# Patient Record
Sex: MALE | Race: WHITE | NOT HISPANIC OR LATINO | Employment: OTHER | URBAN - METROPOLITAN AREA
[De-identification: names, ages, dates, MRNs, and addresses within clinical notes are randomized per-mention and may not be internally consistent; named-entity substitution may affect disease eponyms.]

---

## 2017-06-07 ENCOUNTER — HOSPITAL ENCOUNTER (OUTPATIENT)
Facility: HOSPITAL | Age: 47
Setting detail: OBSERVATION
LOS: 1 days | Discharge: HOME | End: 2017-06-09
Attending: EMERGENCY MEDICINE | Admitting: HOSPITALIST

## 2017-06-07 ENCOUNTER — APPOINTMENT (OUTPATIENT)
Dept: CT IMAGING | Facility: HOSPITAL | Age: 47
End: 2017-06-07

## 2017-06-07 ENCOUNTER — APPOINTMENT (OUTPATIENT)
Dept: GENERAL RADIOLOGY | Facility: HOSPITAL | Age: 47
End: 2017-06-07

## 2017-06-07 DIAGNOSIS — R10.31 RIGHT LOWER QUADRANT ABDOMINAL PAIN: ICD-10-CM

## 2017-06-07 DIAGNOSIS — R41.82 ALTERED MENTAL STATUS, UNSPECIFIED ALTERED MENTAL STATUS TYPE: ICD-10-CM

## 2017-06-07 DIAGNOSIS — A41.9 SEPSIS, DUE TO UNSPECIFIED ORGANISM: Primary | ICD-10-CM

## 2017-06-07 PROBLEM — R10.9 ABDOMINAL PAIN: Status: ACTIVE | Noted: 2017-06-07

## 2017-06-07 PROBLEM — D72.829 LEUKOCYTOSIS: Status: ACTIVE | Noted: 2017-06-07

## 2017-06-07 PROBLEM — R65.10 SIRS (SYSTEMIC INFLAMMATORY RESPONSE SYNDROME) (HCC): Status: ACTIVE | Noted: 2017-06-07

## 2017-06-07 PROBLEM — R79.89 LACTATE BLOOD INCREASED: Status: ACTIVE | Noted: 2017-06-07

## 2017-06-07 PROBLEM — E66.9 OBESE: Status: ACTIVE | Noted: 2017-06-07

## 2017-06-07 PROBLEM — E11.9 TYPE 2 DIABETES MELLITUS WITHOUT COMPLICATION: Status: ACTIVE | Noted: 2017-06-07

## 2017-06-07 PROBLEM — R74.8 ALKALINE PHOSPHATASE ELEVATION: Status: ACTIVE | Noted: 2017-06-07

## 2017-06-07 LAB
ALBUMIN SERPL-MCNC: 4.4 G/DL (ref 3.2–4.8)
ALBUMIN/GLOB SERPL: 1 G/DL (ref 1.5–2.5)
ALP SERPL-CCNC: 106 U/L (ref 25–100)
ALT SERPL W P-5'-P-CCNC: 36 U/L (ref 7–40)
ANION GAP SERPL CALCULATED.3IONS-SCNC: 6 MMOL/L (ref 3–11)
AST SERPL-CCNC: 25 U/L (ref 0–33)
BASOPHILS # BLD AUTO: 0.01 10*3/MM3 (ref 0–0.2)
BASOPHILS NFR BLD AUTO: 0.1 % (ref 0–1)
BILIRUB SERPL-MCNC: 1.4 MG/DL (ref 0.3–1.2)
BILIRUB UR QL STRIP: NEGATIVE
BUN BLD-MCNC: 13 MG/DL (ref 9–23)
BUN/CREAT SERPL: 13 (ref 7–25)
CALCIUM SPEC-SCNC: 9.6 MG/DL (ref 8.7–10.4)
CHLORIDE SERPL-SCNC: 102 MMOL/L (ref 99–109)
CLARITY UR: CLEAR
CO2 SERPL-SCNC: 28 MMOL/L (ref 20–31)
COLOR UR: YELLOW
CREAT BLD-MCNC: 1 MG/DL (ref 0.6–1.3)
D-LACTATE SERPL-SCNC: 2.3 MMOL/L (ref 0.5–2)
D-LACTATE SERPL-SCNC: 2.4 MMOL/L (ref 0.5–2)
D-LACTATE SERPL-SCNC: 2.5 MMOL/L (ref 0.5–2)
DEPRECATED RDW RBC AUTO: 40.2 FL (ref 37–54)
EOSINOPHIL # BLD AUTO: 0.06 10*3/MM3 (ref 0.1–0.3)
EOSINOPHIL NFR BLD AUTO: 0.4 % (ref 0–3)
ERYTHROCYTE [DISTWIDTH] IN BLOOD BY AUTOMATED COUNT: 12.9 % (ref 11.3–14.5)
FLUAV AG NPH QL: NEGATIVE
FLUBV AG NPH QL IA: NEGATIVE
GFR SERPL CREATININE-BSD FRML MDRD: 80 ML/MIN/1.73
GLOBULIN UR ELPH-MCNC: 4.3 GM/DL
GLUCOSE BLD-MCNC: 236 MG/DL (ref 70–100)
GLUCOSE BLDC GLUCOMTR-MCNC: 223 MG/DL (ref 70–130)
GLUCOSE BLDC GLUCOMTR-MCNC: 232 MG/DL (ref 70–130)
GLUCOSE BLDC GLUCOMTR-MCNC: 248 MG/DL (ref 70–130)
GLUCOSE BLDC GLUCOMTR-MCNC: 280 MG/DL (ref 70–130)
GLUCOSE UR STRIP-MCNC: ABNORMAL MG/DL
HCT VFR BLD AUTO: 49.3 % (ref 38.9–50.9)
HGB BLD-MCNC: 17.3 G/DL (ref 13.1–17.5)
HGB UR QL STRIP.AUTO: NEGATIVE
HOLD SPECIMEN: NORMAL
HOLD SPECIMEN: NORMAL
IMM GRANULOCYTES # BLD: 0.03 10*3/MM3 (ref 0–0.03)
IMM GRANULOCYTES NFR BLD: 0.2 % (ref 0–0.6)
KETONES UR QL STRIP: ABNORMAL
LEUKOCYTE ESTERASE UR QL STRIP.AUTO: NEGATIVE
LIPASE SERPL-CCNC: 29 U/L (ref 6–51)
LYMPHOCYTES # BLD AUTO: 0.46 10*3/MM3 (ref 0.6–4.8)
LYMPHOCYTES NFR BLD AUTO: 2.9 % (ref 24–44)
MCH RBC QN AUTO: 29.9 PG (ref 27–31)
MCHC RBC AUTO-ENTMCNC: 35.1 G/DL (ref 32–36)
MCV RBC AUTO: 85.3 FL (ref 80–99)
MONOCYTES # BLD AUTO: 0.44 10*3/MM3 (ref 0–1)
MONOCYTES NFR BLD AUTO: 2.7 % (ref 0–12)
NEUTROPHILS # BLD AUTO: 15.04 10*3/MM3 (ref 1.5–8.3)
NEUTROPHILS NFR BLD AUTO: 93.7 % (ref 41–71)
NITRITE UR QL STRIP: NEGATIVE
PH UR STRIP.AUTO: <=5 [PH] (ref 5–8)
PLATELET # BLD AUTO: 194 10*3/MM3 (ref 150–450)
PMV BLD AUTO: 8.9 FL (ref 6–12)
POTASSIUM BLD-SCNC: 4 MMOL/L (ref 3.5–5.5)
PROCALCITONIN SERPL-MCNC: 0.33 NG/ML
PROT SERPL-MCNC: 8.7 G/DL (ref 5.7–8.2)
PROT UR QL STRIP: NEGATIVE
RBC # BLD AUTO: 5.78 10*6/MM3 (ref 4.2–5.76)
S PYO AG THROAT QL: NEGATIVE
SODIUM BLD-SCNC: 136 MMOL/L (ref 132–146)
SP GR UR STRIP: >=1.03 (ref 1–1.03)
UROBILINOGEN UR QL STRIP: ABNORMAL
WBC NRBC COR # BLD: 16.04 10*3/MM3 (ref 3.5–10.8)
WHOLE BLOOD HOLD SPECIMEN: NORMAL
WHOLE BLOOD HOLD SPECIMEN: NORMAL

## 2017-06-07 PROCEDURE — 84145 PROCALCITONIN (PCT): CPT | Performed by: INTERNAL MEDICINE

## 2017-06-07 PROCEDURE — 82962 GLUCOSE BLOOD TEST: CPT

## 2017-06-07 PROCEDURE — 96361 HYDRATE IV INFUSION ADD-ON: CPT

## 2017-06-07 PROCEDURE — G0378 HOSPITAL OBSERVATION PER HR: HCPCS

## 2017-06-07 PROCEDURE — 25010000002 PIPERACILLIN-TAZOBACTAM: Performed by: INTERNAL MEDICINE

## 2017-06-07 PROCEDURE — 87147 CULTURE TYPE IMMUNOLOGIC: CPT | Performed by: EMERGENCY MEDICINE

## 2017-06-07 PROCEDURE — 63710000001 INSULIN LISPRO (HUMAN) PER 5 UNITS: Performed by: INTERNAL MEDICINE

## 2017-06-07 PROCEDURE — 80053 COMPREHEN METABOLIC PANEL: CPT | Performed by: EMERGENCY MEDICINE

## 2017-06-07 PROCEDURE — 25010000002 ONDANSETRON PER 1 MG: Performed by: INTERNAL MEDICINE

## 2017-06-07 PROCEDURE — 25010000002 VANCOMYCIN HCL IN NACL 2-0.9 GM/500ML-% SOLUTION: Performed by: EMERGENCY MEDICINE

## 2017-06-07 PROCEDURE — 83605 ASSAY OF LACTIC ACID: CPT | Performed by: INTERNAL MEDICINE

## 2017-06-07 PROCEDURE — 96375 TX/PRO/DX INJ NEW DRUG ADDON: CPT

## 2017-06-07 PROCEDURE — 25010000002 PIPERACILLIN-TAZOBACTAM: Performed by: EMERGENCY MEDICINE

## 2017-06-07 PROCEDURE — 83605 ASSAY OF LACTIC ACID: CPT | Performed by: EMERGENCY MEDICINE

## 2017-06-07 PROCEDURE — 99220 PR INITIAL OBSERVATION CARE/DAY 70 MINUTES: CPT | Performed by: INTERNAL MEDICINE

## 2017-06-07 PROCEDURE — 71010 HC CHEST PA OR AP: CPT

## 2017-06-07 PROCEDURE — 81003 URINALYSIS AUTO W/O SCOPE: CPT | Performed by: EMERGENCY MEDICINE

## 2017-06-07 PROCEDURE — 87040 BLOOD CULTURE FOR BACTERIA: CPT | Performed by: EMERGENCY MEDICINE

## 2017-06-07 PROCEDURE — 87880 STREP A ASSAY W/OPTIC: CPT | Performed by: EMERGENCY MEDICINE

## 2017-06-07 PROCEDURE — 74176 CT ABD & PELVIS W/O CONTRAST: CPT

## 2017-06-07 PROCEDURE — 0 DIATRIZOATE MEGLUMINE & SODIUM PER 1 ML: Performed by: EMERGENCY MEDICINE

## 2017-06-07 PROCEDURE — 83690 ASSAY OF LIPASE: CPT | Performed by: EMERGENCY MEDICINE

## 2017-06-07 PROCEDURE — 85025 COMPLETE CBC W/AUTO DIFF WBC: CPT | Performed by: EMERGENCY MEDICINE

## 2017-06-07 PROCEDURE — 96365 THER/PROPH/DIAG IV INF INIT: CPT

## 2017-06-07 PROCEDURE — 87081 CULTURE SCREEN ONLY: CPT | Performed by: EMERGENCY MEDICINE

## 2017-06-07 PROCEDURE — 87804 INFLUENZA ASSAY W/OPTIC: CPT | Performed by: EMERGENCY MEDICINE

## 2017-06-07 PROCEDURE — 25010000002 MORPHINE SULFATE (PF) 2 MG/ML SOLUTION: Performed by: INTERNAL MEDICINE

## 2017-06-07 PROCEDURE — 99284 EMERGENCY DEPT VISIT MOD MDM: CPT

## 2017-06-07 RX ORDER — NICOTINE POLACRILEX 4 MG
15 LOZENGE BUCCAL
Status: DISCONTINUED | OUTPATIENT
Start: 2017-06-07 | End: 2017-06-09 | Stop reason: HOSPADM

## 2017-06-07 RX ORDER — SODIUM CHLORIDE 0.9 % (FLUSH) 0.9 %
1-10 SYRINGE (ML) INJECTION AS NEEDED
Status: DISCONTINUED | OUTPATIENT
Start: 2017-06-07 | End: 2017-06-09 | Stop reason: HOSPADM

## 2017-06-07 RX ORDER — POTASSIUM CHLORIDE 750 MG/1
40 CAPSULE, EXTENDED RELEASE ORAL AS NEEDED
Status: DISCONTINUED | OUTPATIENT
Start: 2017-06-07 | End: 2017-06-09 | Stop reason: HOSPADM

## 2017-06-07 RX ORDER — ACETAMINOPHEN 500 MG
1000 TABLET ORAL ONCE
Status: COMPLETED | OUTPATIENT
Start: 2017-06-07 | End: 2017-06-07

## 2017-06-07 RX ORDER — ACETAMINOPHEN 325 MG/1
650 TABLET ORAL EVERY 4 HOURS PRN
Status: DISCONTINUED | OUTPATIENT
Start: 2017-06-07 | End: 2017-06-09 | Stop reason: HOSPADM

## 2017-06-07 RX ORDER — VANCOMYCIN 2 GRAM/500 ML IN 0.9 % SODIUM CHLORIDE INTRAVENOUS
20 ONCE
Status: COMPLETED | OUTPATIENT
Start: 2017-06-07 | End: 2017-06-07

## 2017-06-07 RX ORDER — SODIUM CHLORIDE 9 MG/ML
75 INJECTION, SOLUTION INTRAVENOUS CONTINUOUS
Status: DISCONTINUED | OUTPATIENT
Start: 2017-06-07 | End: 2017-06-09 | Stop reason: HOSPADM

## 2017-06-07 RX ORDER — SENNA AND DOCUSATE SODIUM 50; 8.6 MG/1; MG/1
2 TABLET, FILM COATED ORAL 2 TIMES DAILY PRN
Status: DISCONTINUED | OUTPATIENT
Start: 2017-06-07 | End: 2017-06-09 | Stop reason: HOSPADM

## 2017-06-07 RX ORDER — POTASSIUM CHLORIDE 1.5 G/1.77G
40 POWDER, FOR SOLUTION ORAL AS NEEDED
Status: DISCONTINUED | OUTPATIENT
Start: 2017-06-07 | End: 2017-06-09 | Stop reason: HOSPADM

## 2017-06-07 RX ORDER — MORPHINE SULFATE 2 MG/ML
2 INJECTION, SOLUTION INTRAMUSCULAR; INTRAVENOUS
Status: DISCONTINUED | OUTPATIENT
Start: 2017-06-07 | End: 2017-06-09 | Stop reason: HOSPADM

## 2017-06-07 RX ORDER — SODIUM CHLORIDE 0.9 % (FLUSH) 0.9 %
10 SYRINGE (ML) INJECTION AS NEEDED
Status: DISCONTINUED | OUTPATIENT
Start: 2017-06-07 | End: 2017-06-09 | Stop reason: HOSPADM

## 2017-06-07 RX ORDER — DEXTROSE MONOHYDRATE 25 G/50ML
25 INJECTION, SOLUTION INTRAVENOUS
Status: DISCONTINUED | OUTPATIENT
Start: 2017-06-07 | End: 2017-06-09 | Stop reason: HOSPADM

## 2017-06-07 RX ORDER — ONDANSETRON 2 MG/ML
4 INJECTION INTRAMUSCULAR; INTRAVENOUS EVERY 6 HOURS PRN
Status: DISCONTINUED | OUTPATIENT
Start: 2017-06-07 | End: 2017-06-09 | Stop reason: HOSPADM

## 2017-06-07 RX ORDER — ONDANSETRON 4 MG/1
4 TABLET, FILM COATED ORAL EVERY 6 HOURS PRN
Status: DISCONTINUED | OUTPATIENT
Start: 2017-06-07 | End: 2017-06-09 | Stop reason: HOSPADM

## 2017-06-07 RX ADMIN — SODIUM CHLORIDE 125 ML/HR: 9 INJECTION, SOLUTION INTRAVENOUS at 17:16

## 2017-06-07 RX ADMIN — INSULIN LISPRO 6 UNITS: 100 INJECTION, SOLUTION INTRAVENOUS; SUBCUTANEOUS at 20:24

## 2017-06-07 RX ADMIN — Medication 2000 MG: at 14:17

## 2017-06-07 RX ADMIN — Medication 15 ML: at 12:40

## 2017-06-07 RX ADMIN — ACETAMINOPHEN 650 MG: 325 TABLET, FILM COATED ORAL at 20:24

## 2017-06-07 RX ADMIN — INSULIN LISPRO 4 UNITS: 100 INJECTION, SOLUTION INTRAVENOUS; SUBCUTANEOUS at 17:16

## 2017-06-07 RX ADMIN — SODIUM CHLORIDE 1000 ML: 9 INJECTION, SOLUTION INTRAVENOUS at 14:20

## 2017-06-07 RX ADMIN — ACETAMINOPHEN 650 MG: 325 TABLET, FILM COATED ORAL at 17:20

## 2017-06-07 RX ADMIN — TAZOBACTAM SODIUM AND PIPERACILLIN SODIUM 4.5 G: .5; 4 INJECTION, POWDER, LYOPHILIZED, FOR SOLUTION INTRAVENOUS at 12:54

## 2017-06-07 RX ADMIN — PIPERACILLIN SODIUM,TAZOBACTAM SODIUM 3.38 G: 3; .375 INJECTION, POWDER, FOR SOLUTION INTRAVENOUS at 23:20

## 2017-06-07 RX ADMIN — ONDANSETRON 4 MG: 2 INJECTION INTRAMUSCULAR; INTRAVENOUS at 17:20

## 2017-06-07 RX ADMIN — PIPERACILLIN SODIUM,TAZOBACTAM SODIUM 3.38 G: 3; .375 INJECTION, POWDER, FOR SOLUTION INTRAVENOUS at 17:16

## 2017-06-07 RX ADMIN — SODIUM CHLORIDE 2967 ML: 9 INJECTION, SOLUTION INTRAVENOUS at 15:12

## 2017-06-07 RX ADMIN — SODIUM CHLORIDE 1000 ML: 9 INJECTION, SOLUTION INTRAVENOUS at 12:47

## 2017-06-07 RX ADMIN — ACETAMINOPHEN 1000 MG: 500 TABLET ORAL at 12:49

## 2017-06-07 RX ADMIN — MORPHINE SULFATE 2 MG: 2 INJECTION, SOLUTION INTRAMUSCULAR; INTRAVENOUS at 18:57

## 2017-06-07 NOTE — H&P
"      HOSPITAL MEDICINE HISTORY AND PHYSICAL    PCP: Juaquin Smith MD    Chief Complaint: abd pain, confusion    Subjective     History of Present Illness  47 year old male presenting from home with fever, abd pain, and mild confusion. Started feeling poorly yesterday with generalized fatigue. Ate pizza last night and noticed 1-2 hours later feeling \"funny and tired.\" Then upon waking this am noticed severe lower abdominal pain, worse in RLQ, and also a fever. Pain has now improved after treatment in ED (IVF, vanc, zosyn.) No N/V, diarrhea. Had an event similar to this last year at which time he was diagnosed with gastroenteritis.       Review of Systems   Otherwise complete ROS is negative except as mentioned in the HPI.       Past Medical History:   Past Medical History:   Diagnosis Date   • Diabetes mellitus        Past Surgical History:History reviewed. No pertinent surgical history.    Family History: personally reviewed and found to be noncontributory.    Social History:  reports that he has never smoked. He does not have any smokeless tobacco history on file. He reports that he does not drink alcohol or use illicit drugs.    Medications:    (Not in a hospital admission)  No Known Allergies    Objective    Physical Exam:   Vital Signs: /77  Pulse 109  Temp (!) 102.9 °F (39.4 °C) (Oral)   Resp 20  Ht 70\" (177.8 cm)  Wt 218 lb (98.9 kg)  SpO2 97%  BMI 31.28 kg/m2  Physical Exam  Gen-no acute distress, appears comfortable, obese  HEENT-atraumatic, normocephalic, PERRLA, EOMI, moist mucous membranes present  CV-tachy, regular, no murmur  Resp-CTAB, no wheezes or rales; normal respiratory effort  Abd-soft, NT, ND, +BS; no rebound or guarding  Ext-no edema or clubbing  Skin-warm, dry, no rashes or lesions noted  Neuro-A&Ox3, CN II-XII grossly intact; equal strength in upper and lower extremities; no focal deficits  Psych-appropriate mood and behavior    Results Reviewed:  I have personally reviewed " current lab, radiology, and data and agree.    Results from last 7 days  Lab Units 06/07/17  1145   WBC 10*3/mm3 16.04*   HEMOGLOBIN g/dL 17.3   PLATELETS 10*3/mm3 194       Results from last 7 days  Lab Units 06/07/17  1145   SODIUM mmol/L 136   POTASSIUM mmol/L 4.0   TOTAL CO2 mmol/L 28.0   CREATININE mg/dL 1.00   GLUCOSE mg/dL 236*   CALCIUM mg/dL 9.6     CXR pending    CT A/P personally reviewed without obvious intra-abdominal disease. Agree with interpretation.      Assessment/Plan   Assessment & Plan  Principal Problem:    SIRS (systemic inflammatory response syndrome)  Active Problems:    Leukocytosis    Abdominal pain    Lactate blood increased    Alkaline phosphatase elevation    Obese    Type 2 diabetes mellitus without complication    Plan:  --Patient may have viral enteritis vs food bourne illness. CT A/P with reactive lymphadenopathy. Will await CXR, but no s/s of pulmonary pathology. Will continue zosyn for now.  --IVF and reflex lactate per protocol.   --Patient's alk phos is mildly elevated but no RUQ pain or CT findings concerning for gallbladder pathology. If patient develops return of abdominal pain will consider HIDA.   --Repeat labs in am.    OBSERVATION status, however if further evaluation or treatment plans warrant, status may change. Based upon current information, I predict patient's care encounter to be less than or equal to 2 midnights.    Zainab Lowery II, DO   06/07/17   2:07 PM

## 2017-06-07 NOTE — PLAN OF CARE
Problem: Patient Care Overview (Adult)  Goal: Plan of Care Review  Outcome: Ongoing (interventions implemented as appropriate)    06/07/17 0188   Coping/Psychosocial Response Interventions   Plan Of Care Reviewed With patient;spouse   Patient Care Overview   Progress progress toward functional goals as expected

## 2017-06-07 NOTE — PROGRESS NOTES
Discharge Planning Assessment  [facility]     Patient Name: Gagan Oconnell  MRN: [id number]  Today's Date: 6/7/2017    Admit Date: 6/7/2017          Discharge Needs Assessment       06/07/17 1426    Living Environment    Lives With spouse;child(rodolfo), dependent    Living Arrangements house    Transportation Available family or friend will provide    Living Environment    Provides Primary Care For spouse;child(rodolfo)    Quality Of Family Relationships supportive    Able to Return to Prior Living Arrangements yes    Discharge Needs Assessment    Concerns To Be Addressed other (see comments)    Concerns Comments Pt has no healthcare insurance    Readmission Within The Last 30 Days no previous admission in last 30 days    Equipment Currently Used at Home none    Equipment Needed After Discharge none    Discharge Disposition still a patient    Discharge Contact Information if Applicable [phone]            Discharge Plan       06/07/17 1423    Case Management/Social Work Plan    Plan Home    Patient/Family In Agreement With Plan yes    Additional Comments Spoke with pt. at . Pt lives with spouse and dependent children in [facility]. Pt. is independent with ADL's and mobility. Pt has no DME or HH. His PCP is Tram Chance Pt states he does not take medications regularly and he does not have insurance so he will pay out of pocket. Plan for discharge is to return home. Pt  to transport home via car, when medically ready. Case Management will follow and assist with any discharge planning needs.        Discharge Placement     No information found                Demographic Summary       06/07/17 1422    Referral Information    Arrived From home or self-care    Reason For Consult discharge planning    Contact Information    Permission Granted to Share Information With family/designee    Comments AnishJosi (Spouse) Home Phone: [phone]    Primary Care Physician Information    Name TRAM CHANCE             Functional Status       06/07/17 1426    Functional Status Prior    Ambulation 0-->independent    Transferring 0-->independent    Toileting 0-->independent    Bathing 0-->independent    Dressing 0-->independent    Eating 0-->independent    Communication 0-->understands/communicates without difficulty    Swallowing 0-->swallows foods/liquids without difficulty    IADL    Medications independent    Meal Preparation independent    Housekeeping independent    Laundry independent    Shopping independent    Oral Care independent    Activity Tolerance    Usual Activity Tolerance good    Current Activity Tolerance poor    Employment/Financial    Employment/Finance Comments Pt states he has no insurance            Psychosocial     None            Abuse/Neglect     None            Legal     None            Substance Abuse     None            Patient Forms     None          Arabella Rodgers RN

## 2017-06-07 NOTE — ED PROVIDER NOTES
"Subjective   HPI Comments: Gagan Oconnell is a 47 y.o.male who presents to the ED with c/o abd pain. Yesterday throughout the day he felt fatigued and ate pizza at 1500 and for dinner ate a can of beef stew. He woke up this morning with severe low abdominal pain when trying to have a BM, nausea and a mild fever. He tried to go to work but had to lay down for relief. He has continue to feel fatigued and as if his abd is \"in a knot\" and he has generalized myalgias. In the ED he states that sitting on the floor makes him more comfortable, he has a temp of 100.8 and his wife states that he is agitated and not acting like himself.     Hx of type 2 DM, he does not take medication for this and his sugar runs in the 240-250s      Patient is a 47 y.o. male presenting with abdominal pain.   History provided by:  Patient  Abdominal Pain   Pain location:  Suprapubic  Pain quality: cramping    Pain radiates to:  Does not radiate  Pain severity:  Severe  Onset quality:  Sudden  Duration:  1 day  Timing:  Constant  Progression:  Waxing and waning  Chronicity:  New  Worsened by:  Bowel movements  Associated symptoms: anorexia, fatigue, fever and nausea    Associated symptoms: no chest pain, no diarrhea, no dysuria, no shortness of breath, no sore throat and no vomiting        Review of Systems   Constitutional: Positive for fatigue and fever.   HENT: Negative for sore throat.    Respiratory: Negative for shortness of breath.    Cardiovascular: Negative for chest pain.   Gastrointestinal: Positive for abdominal pain, anorexia and nausea. Negative for diarrhea and vomiting.   Genitourinary: Negative for dysuria.   Psychiatric/Behavioral: Positive for agitation and confusion.   All other systems reviewed and are negative.      Past Medical History:   Diagnosis Date   • Diabetes mellitus        No Known Allergies    History reviewed. No pertinent surgical history.    History reviewed. No pertinent family history.    Social History "     Social History   • Marital status:      Spouse name: N/A   • Number of children: N/A   • Years of education: N/A     Social History Main Topics   • Smoking status: Never Smoker   • Smokeless tobacco: None   • Alcohol use No   • Drug use: No   • Sexual activity: Defer     Other Topics Concern   • None     Social History Narrative   • None         Objective   Physical Exam   Constitutional: He is oriented to person, place, and time. He appears well-developed and well-nourished. No distress.   appears uncomfortable, somewhat rambling speech   HENT:   Head: Normocephalic and atraumatic.   Mild bitonsillar enlargement, no exudate, may be chronic. No erythema.    Eyes: Conjunctivae are normal. No scleral icterus.   Neck: Normal range of motion. Neck supple.   Cardiovascular: Regular rhythm and normal heart sounds.  Tachycardia present.    Pulmonary/Chest: Effort normal and breath sounds normal. No respiratory distress.   Abdominal: Soft. There is tenderness (mild RLQ TTP).   Musculoskeletal: Normal range of motion. He exhibits no edema.   Lymphadenopathy:     He has no cervical adenopathy.   Neurological: He is alert and oriented to person, place, and time.   Skin: Skin is warm and dry. No rash noted. He is not diaphoretic.   Psychiatric: He has a normal mood and affect. His behavior is normal.   Nursing note and vitals reviewed.      Critical Care  Performed by: BRANDON GODINEZ  Authorized by: BRANDON GODINEZ   Total critical care time: 30 minutes  Critical care time was exclusive of separately billable procedures and treating other patients and teaching time.  Critical care was necessary to treat or prevent imminent or life-threatening deterioration of the following conditions: sepsis.  Critical care was time spent personally by me on the following activities: discussions with consultants, development of treatment plan with patient or surrogate, evaluation of patient's response to treatment, examination of  patient, ordering and performing treatments and interventions, obtaining history from patient or surrogate, ordering and review of laboratory studies, ordering and review of radiographic studies, re-evaluation of patient's condition, pulse oximetry and review of old charts.  Comments: Several evaluations, initiation of sepsis abx, fluid rehydration, consultation with hospitalist, decision to admit to the floor.                ED Course  ED Course   Comment By Time   On recheck, pt has an oral temp of 102.9 and on abdominal exam he no longer has RLQ tenderness. Luis Eduardo Hussein 06/07 1244   Dr. Gallegos discussed with the hospitalist who will admit. Luis Eduardo Hussein 06/07 1350   Dr. Gallegos discussed the plan of care with the pt and family including need for admission, all are agreeable.     Pt's family report that he is calmer and less agitated and pt reports he feels much improved and is more comfortable. Luis Eduardo Hussein 06/07 1356       Recent Results (from the past 24 hour(s))   POC Glucose Fingerstick    Collection Time: 06/07/17 10:30 AM   Result Value Ref Range    Glucose 248 (H) 70 - 130 mg/dL   POC Glucose Fingerstick    Collection Time: 06/07/17 11:42 AM   Result Value Ref Range    Glucose 232 (H) 70 - 130 mg/dL   Comprehensive Metabolic Panel    Collection Time: 06/07/17 11:45 AM   Result Value Ref Range    Glucose 236 (H) 70 - 100 mg/dL    BUN 13 9 - 23 mg/dL    Creatinine 1.00 0.60 - 1.30 mg/dL    Sodium 136 132 - 146 mmol/L    Potassium 4.0 3.5 - 5.5 mmol/L    Chloride 102 99 - 109 mmol/L    CO2 28.0 20.0 - 31.0 mmol/L    Calcium 9.6 8.7 - 10.4 mg/dL    Total Protein 8.7 (H) 5.7 - 8.2 g/dL    Albumin 4.40 3.20 - 4.80 g/dL    ALT (SGPT) 36 7 - 40 U/L    AST (SGOT) 25 0 - 33 U/L    Alkaline Phosphatase 106 (H) 25 - 100 U/L    Total Bilirubin 1.4 (H) 0.3 - 1.2 mg/dL    eGFR Non African Amer 80 >60 mL/min/1.73    Globulin 4.3 gm/dL    A/G Ratio 1.0 (L) 1.5 - 2.5 g/dL    BUN/Creatinine Ratio 13.0 7.0 - 25.0    Anion Gap  6.0 3.0 - 11.0 mmol/L   Lipase    Collection Time: 06/07/17 11:45 AM   Result Value Ref Range    Lipase 29 6 - 51 U/L   Light Blue Top    Collection Time: 06/07/17 11:45 AM   Result Value Ref Range    Extra Tube hold for add-on    Green Top (Gel)    Collection Time: 06/07/17 11:45 AM   Result Value Ref Range    Extra Tube Hold for add-ons.    Lavender Top    Collection Time: 06/07/17 11:45 AM   Result Value Ref Range    Extra Tube hold for add-on    Gold Top - SST    Collection Time: 06/07/17 11:45 AM   Result Value Ref Range    Extra Tube Hold for add-ons.    CBC Auto Differential    Collection Time: 06/07/17 11:45 AM   Result Value Ref Range    WBC 16.04 (H) 3.50 - 10.80 10*3/mm3    RBC 5.78 (H) 4.20 - 5.76 10*6/mm3    Hemoglobin 17.3 13.1 - 17.5 g/dL    Hematocrit 49.3 38.9 - 50.9 %    MCV 85.3 80.0 - 99.0 fL    MCH 29.9 27.0 - 31.0 pg    MCHC 35.1 32.0 - 36.0 g/dL    RDW 12.9 11.3 - 14.5 %    RDW-SD 40.2 37.0 - 54.0 fl    MPV 8.9 6.0 - 12.0 fL    Platelets 194 150 - 450 10*3/mm3    Neutrophil % 93.7 (H) 41.0 - 71.0 %    Lymphocyte % 2.9 (L) 24.0 - 44.0 %    Monocyte % 2.7 0.0 - 12.0 %    Eosinophil % 0.4 0.0 - 3.0 %    Basophil % 0.1 0.0 - 1.0 %    Immature Grans % 0.2 0.0 - 0.6 %    Neutrophils, Absolute 15.04 (H) 1.50 - 8.30 10*3/mm3    Lymphocytes, Absolute 0.46 (L) 0.60 - 4.80 10*3/mm3    Monocytes, Absolute 0.44 0.00 - 1.00 10*3/mm3    Eosinophils, Absolute 0.06 (L) 0.10 - 0.30 10*3/mm3    Basophils, Absolute 0.01 0.00 - 0.20 10*3/mm3    Immature Grans, Absolute 0.03 0.00 - 0.03 10*3/mm3   Lactic Acid, Plasma    Collection Time: 06/07/17 11:45 AM   Result Value Ref Range    Lactate 2.4 (C) 0.5 - 2.0 mmol/L   Urinalysis With / Culture If Indicated    Collection Time: 06/07/17 12:36 PM   Result Value Ref Range    Color, UA Yellow Yellow, Straw    Appearance, UA Clear Clear    pH, UA <=5.0 5.0 - 8.0    Specific Gravity, UA >=1.030 1.001 - 1.030    Glucose, UA >=1000 mg/dL (3+) (A) Negative    Ketones, UA 15  mg/dL (1+) (A) Negative    Bilirubin, UA Negative Negative    Blood, UA Negative Negative    Protein, UA Negative Negative    Leuk Esterase, UA Negative Negative    Nitrite, UA Negative Negative    Urobilinogen, UA 0.2 E.U./dL 0.2 - 1.0 E.U./dL   Rapid Strep A Screen    Collection Time: 06/07/17 12:57 PM   Result Value Ref Range    Strep A Ag Negative Negative   Influenza Antigen    Collection Time: 06/07/17 12:57 PM   Result Value Ref Range    Influenza A Ag, EIA Negative Negative    Influenza B Ag, EIA Negative Negative     Note: In addition to lab results from this visit, the labs listed above may include labs taken at another facility or during a different encounter within the last 24 hours. Please correlate lab times with ED admission and discharge times for further clarification of the services performed during this visit.    CT Abdomen Pelvis Without Contrast    (Results Pending)   XR Chest 1 View    (Results Pending)     Vitals:    06/07/17 1146 06/07/17 1234 06/07/17 1235 06/07/17 1244   BP:  126/77     Pulse: 106  109    Resp:       Temp:    (!) 102.9 °F (39.4 °C)   TempSrc:    Oral   SpO2: 97%  97%    Weight:       Height:         Medications   sodium chloride 0.9 % flush 10 mL (not administered)   sodium chloride 0.9 % bolus 2,967 mL (1,000 mL Intravenous New Bag 6/7/17 1247)   vancomycin IVPB 2000 mg in 0.9% Sodium Chloride (premix) 500 mL (not administered)   piperacillin-tazobactam (ZOSYN) 4.5 g/100 mL 0.9% NS IVPB (mbp) (4.5 g Intravenous New Bag 6/7/17 1254)   diatrizoate meglumine-sodium (GASTROGRAFIN) 66-10 % solution 15 mL (15 mL Oral Given 6/7/17 1240)   acetaminophen (TYLENOL) tablet 1,000 mg (1,000 mg Oral Given 6/7/17 1249)     ECG/EMG Results (last 24 hours)     ** No results found for the last 24 hours. **                      MDM    Final diagnoses:   Sepsis, due to unspecified organism   Altered mental status, unspecified altered mental status type   Right lower quadrant abdominal pain -  transient and now resolved.       Documentation assistance provided by sally Hussein.  Information recorded by the sally was done at my direction and has been verified and validated by me.     Luis Eduardo Hussein  06/07/17 1145       Luis Eduardo Hussein  06/07/17 1246       Luis Eduardo Hussein  06/07/17 0076       Gray Gallegos MD  06/09/17 5704

## 2017-06-08 LAB
ANION GAP SERPL CALCULATED.3IONS-SCNC: 7 MMOL/L (ref 3–11)
BUN BLD-MCNC: 9 MG/DL (ref 9–23)
BUN/CREAT SERPL: 10 (ref 7–25)
C DIFF TOX GENS STL QL NAA+PROBE: NOT DETECTED
CALCIUM SPEC-SCNC: 7.7 MG/DL (ref 8.7–10.4)
CHLORIDE SERPL-SCNC: 107 MMOL/L (ref 99–109)
CO2 SERPL-SCNC: 23 MMOL/L (ref 20–31)
CREAT BLD-MCNC: 0.9 MG/DL (ref 0.6–1.3)
DEPRECATED RDW RBC AUTO: 41.5 FL (ref 37–54)
ERYTHROCYTE [DISTWIDTH] IN BLOOD BY AUTOMATED COUNT: 13.1 % (ref 11.3–14.5)
GFR SERPL CREATININE-BSD FRML MDRD: 91 ML/MIN/1.73
GLUCOSE BLD-MCNC: 223 MG/DL (ref 70–100)
GLUCOSE BLDC GLUCOMTR-MCNC: 216 MG/DL (ref 70–130)
GLUCOSE BLDC GLUCOMTR-MCNC: 229 MG/DL (ref 70–130)
GLUCOSE BLDC GLUCOMTR-MCNC: 233 MG/DL (ref 70–130)
GLUCOSE BLDC GLUCOMTR-MCNC: 235 MG/DL (ref 70–130)
HCT VFR BLD AUTO: 40.3 % (ref 38.9–50.9)
HGB BLD-MCNC: 13.6 G/DL (ref 13.1–17.5)
MCH RBC QN AUTO: 29.2 PG (ref 27–31)
MCHC RBC AUTO-ENTMCNC: 33.7 G/DL (ref 32–36)
MCV RBC AUTO: 86.7 FL (ref 80–99)
PLATELET # BLD AUTO: 155 10*3/MM3 (ref 150–450)
PMV BLD AUTO: 8.9 FL (ref 6–12)
POTASSIUM BLD-SCNC: 3.3 MMOL/L (ref 3.5–5.5)
RBC # BLD AUTO: 4.65 10*6/MM3 (ref 4.2–5.76)
SODIUM BLD-SCNC: 137 MMOL/L (ref 132–146)
WBC NRBC COR # BLD: 7.78 10*3/MM3 (ref 3.5–10.8)
WBC STL QL MICRO: NORMAL

## 2017-06-08 PROCEDURE — 87493 C DIFF AMPLIFIED PROBE: CPT | Performed by: HOSPITALIST

## 2017-06-08 PROCEDURE — 25010000002 MORPHINE SULFATE (PF) 2 MG/ML SOLUTION: Performed by: INTERNAL MEDICINE

## 2017-06-08 PROCEDURE — 96376 TX/PRO/DX INJ SAME DRUG ADON: CPT

## 2017-06-08 PROCEDURE — G0108 DIAB MANAGE TRN  PER INDIV: HCPCS

## 2017-06-08 PROCEDURE — 80048 BASIC METABOLIC PNL TOTAL CA: CPT | Performed by: INTERNAL MEDICINE

## 2017-06-08 PROCEDURE — 82962 GLUCOSE BLOOD TEST: CPT

## 2017-06-08 PROCEDURE — 25010000002 ENOXAPARIN PER 10 MG: Performed by: INTERNAL MEDICINE

## 2017-06-08 PROCEDURE — 87046 STOOL CULTR AEROBIC BACT EA: CPT | Performed by: HOSPITALIST

## 2017-06-08 PROCEDURE — 93010 ELECTROCARDIOGRAM REPORT: CPT | Performed by: INTERNAL MEDICINE

## 2017-06-08 PROCEDURE — 93005 ELECTROCARDIOGRAM TRACING: CPT | Performed by: PHYSICIAN ASSISTANT

## 2017-06-08 PROCEDURE — 85027 COMPLETE CBC AUTOMATED: CPT | Performed by: INTERNAL MEDICINE

## 2017-06-08 PROCEDURE — 25010000002 PIPERACILLIN-TAZOBACTAM: Performed by: INTERNAL MEDICINE

## 2017-06-08 PROCEDURE — G0378 HOSPITAL OBSERVATION PER HR: HCPCS

## 2017-06-08 PROCEDURE — 96372 THER/PROPH/DIAG INJ SC/IM: CPT

## 2017-06-08 PROCEDURE — 87205 SMEAR GRAM STAIN: CPT | Performed by: HOSPITALIST

## 2017-06-08 PROCEDURE — 87045 FECES CULTURE AEROBIC BACT: CPT | Performed by: HOSPITALIST

## 2017-06-08 PROCEDURE — 25010000002 ONDANSETRON PER 1 MG: Performed by: INTERNAL MEDICINE

## 2017-06-08 PROCEDURE — 99226 PR SBSQ OBSERVATION CARE/DAY 35 MINUTES: CPT | Performed by: HOSPITALIST

## 2017-06-08 RX ORDER — SACCHAROMYCES BOULARDII 250 MG
250 CAPSULE ORAL 2 TIMES DAILY
Status: DISCONTINUED | OUTPATIENT
Start: 2017-06-08 | End: 2017-06-09 | Stop reason: HOSPADM

## 2017-06-08 RX ORDER — CALCIUM CARBONATE 200(500)MG
2 TABLET,CHEWABLE ORAL 3 TIMES DAILY PRN
Status: DISCONTINUED | OUTPATIENT
Start: 2017-06-08 | End: 2017-06-09 | Stop reason: HOSPADM

## 2017-06-08 RX ORDER — FAMOTIDINE 20 MG/1
20 TABLET, FILM COATED ORAL ONCE
Status: COMPLETED | OUTPATIENT
Start: 2017-06-08 | End: 2017-06-08

## 2017-06-08 RX ADMIN — INSULIN LISPRO 4 UNITS: 100 INJECTION, SOLUTION INTRAVENOUS; SUBCUTANEOUS at 08:46

## 2017-06-08 RX ADMIN — INSULIN LISPRO 4 UNITS: 100 INJECTION, SOLUTION INTRAVENOUS; SUBCUTANEOUS at 17:18

## 2017-06-08 RX ADMIN — ENOXAPARIN SODIUM 40 MG: 40 INJECTION SUBCUTANEOUS at 06:05

## 2017-06-08 RX ADMIN — INSULIN LISPRO 4 UNITS: 100 INJECTION, SOLUTION INTRAVENOUS; SUBCUTANEOUS at 20:34

## 2017-06-08 RX ADMIN — Medication 250 MG: at 17:18

## 2017-06-08 RX ADMIN — PIPERACILLIN SODIUM,TAZOBACTAM SODIUM 3.38 G: 3; .375 INJECTION, POWDER, FOR SOLUTION INTRAVENOUS at 23:19

## 2017-06-08 RX ADMIN — Medication 250 MG: at 08:46

## 2017-06-08 RX ADMIN — MORPHINE SULFATE 2 MG: 2 INJECTION, SOLUTION INTRAMUSCULAR; INTRAVENOUS at 20:56

## 2017-06-08 RX ADMIN — ACETAMINOPHEN 650 MG: 325 TABLET, FILM COATED ORAL at 23:19

## 2017-06-08 RX ADMIN — PIPERACILLIN SODIUM,TAZOBACTAM SODIUM 3.38 G: 3; .375 INJECTION, POWDER, FOR SOLUTION INTRAVENOUS at 12:04

## 2017-06-08 RX ADMIN — PIPERACILLIN SODIUM,TAZOBACTAM SODIUM 3.38 G: 3; .375 INJECTION, POWDER, FOR SOLUTION INTRAVENOUS at 17:17

## 2017-06-08 RX ADMIN — ACETAMINOPHEN 650 MG: 325 TABLET, FILM COATED ORAL at 14:50

## 2017-06-08 RX ADMIN — MORPHINE SULFATE 2 MG: 2 INJECTION, SOLUTION INTRAMUSCULAR; INTRAVENOUS at 02:45

## 2017-06-08 RX ADMIN — PIPERACILLIN SODIUM,TAZOBACTAM SODIUM 3.38 G: 3; .375 INJECTION, POWDER, FOR SOLUTION INTRAVENOUS at 06:05

## 2017-06-08 RX ADMIN — FAMOTIDINE 20 MG: 20 TABLET, FILM COATED ORAL at 22:30

## 2017-06-08 RX ADMIN — ONDANSETRON 4 MG: 2 INJECTION INTRAMUSCULAR; INTRAVENOUS at 03:35

## 2017-06-08 RX ADMIN — INSULIN LISPRO 4 UNITS: 100 INJECTION, SOLUTION INTRAVENOUS; SUBCUTANEOUS at 12:04

## 2017-06-08 NOTE — CONSULTS
"Diabetes Education  Assessment/Teaching    Patient Name:  Gagan Oconnell  YOB: 1970  MRN: [id number]  Admit Date:  6/7/2017      Assessment Date:  6/8/2017       Most Recent Value    General Information      Referral From:  A1c, Blood glucose, MD order    Height  5' 10\" (1.778 m)    Height Method  Stated    Weight  215 lb 9.6 oz (97.8 kg)    Weight Method  Standing scale    Pregnancy Assessment     Diabetes History     What type of diabetes do you have?  Type 2    Length of Diabetes Diagnosis  1 - 5 years    Current DM knowledge  poor    Have you had diabetes education/teaching in the past?  no [Pt refused a free f/u class/education]    Do you test your blood sugar at home?  yes    Frequency of checks  Once weekly    Meter type  Relion    Who performs the test?  self    Typical readings  200+ - 400+    Have you had low blood sugar? (<70mg/dl)  no    Have you had high blood sugar? (>140mg/dl)  yes    How often do you have high blood sugar?  frequently    Education Preferences     What areas of diabetes would you like to learn about?  avoiding high blood sugar, diabetes complications, diet information, exercise information, medications for diabetes, understanding diabetes, testing my blood sugar at home, stress/coping skills, resources on diabetes    Nutrition Information     Assessment Topics     Healthy Eating - Assessment  Needs education    Being Active - Assessment  Needs education    Taking Medication - Assessment  Needs education    Problem Solving - Assessment  Needs education    Reducing Risk - Assessment  Needs education    Healthy Coping - Assessment  Needs education    Monitoring - Assessment  Needs education    DM Goals     Healthy Eating - Goal  0-7 days from discharge    Being Active - Goal  0-7 days from discharge    Taking Medication - Goal  0-7 days from discharge    Problem Solving - Goal  0-7 days from discharge    Reducing Risk - Goal  0-7 days from discharge    Healthy Coping - " Goal  0-7 days from discharge    Monitoring - Goal  0-7 days from discharge               Most Recent Value    DM Education Needs     Meter  Has own    Meter Type  Other (comment) [Relion]    Frequency of Testing  3 times a day    Blood Glucose Target Range      Medication  Oral    Problem Solving  Hypoglycemia, Hyperglycemia, Sick days, Signs, Symptoms, Treatment    Reducing Risks  A1C testing, Blood pressure, Eye exam, Immunizations, Cardiovascular    Healthy Eating  Other (comment) [Plate method and portion control used for education.]    Physical Activity  Walking    Physical Activity Frequency  Occasionally    Healthy Coping  Appropriate    Motivation  Engaged    Teaching Method  Explanation, Discussion, Demonstration, Handouts, Teach back    Patient Response  Verbalized understanding, Needs reinforcement            Other Comments:    Pt stated that he has not been checking BG as should and also skipping medications for about 6 months. Drinks a lot of soda (5-6/day). Per pt., he was on metformin but was changed to a new medication which he could not recall. Stated that he has a lot of stress lately which doesn't help his situation. Offered pt a free outpt f/u or class but pt refused. Educated on the importance of taking medications as prescribed and to check FSBG frequently. Discussed and taught patient about type 2 diabetes self-management, risk factors, and importance of blood glucose control to reduce complications. Target blood glucose readings and A1c goals per ADA were reviewed. Reviewed with patient current A1c and discussed its significance. Signs, symptoms and treatment of hyperglycemia and hypoglycemia were discussed. Lifestyle changes such as physical activity and healthy eating were encouraged. Stressed the importance of strict blood sugar control to prevent complications. Pt instructed to  check blood sugar 2-3 times per day and to call PCP if  Blood glucose is higher than 180 two times or  more.  Patient was encouraged to keep record of blood glucose readings to take to follow up appointment with PCP.  Pt was offered a free op follow up appointment however declined at this time.          Electronically signed by:  Hu Lowe RN  06/08/17 12:03 PM

## 2017-06-08 NOTE — PROGRESS NOTES
Continued Stay Note  [facility]     Patient Name: Gagan Oconnell  MRN: [id number]  Today's Date: 6/8/2017    Admit Date: 6/7/2017          Discharge Plan       06/08/17 0836    Case Management/Social Work Plan    Additional Comments Spoke with Med assist pt [facility] and did not qualify for medicaid.               Discharge Codes     None            Staci Singh

## 2017-06-08 NOTE — PLAN OF CARE
Problem: Patient Care Overview (Adult)  Goal: Plan of Care Review  Outcome: Ongoing (interventions implemented as appropriate)    06/08/17 1513   Coping/Psychosocial Response Interventions   Plan Of Care Reviewed With patient   Patient Care Overview   Progress improving         Problem: Infection, Risk/Actual (Adult)  Goal: Identify Related Risk Factors and Signs and Symptoms  Outcome: Ongoing (interventions implemented as appropriate)    06/08/17 1513   Infection, Risk/Actual   Infection, Risk/Actual: Related Risk Factors blood disorder   Signs and Symptoms (Infection, Risk/Actual) blood glucose changes

## 2017-06-08 NOTE — PROGRESS NOTES
[facility] Medicine Services  INPATIENT PROGRESS NOTE    Date of Admission: 6/7/2017  Length of Stay: 1  Primary Care Physician: Juaquin Smith MD    Subjective   CC:  Abdominal cramping  HPI:    Loose stool. F/C noted. Some abdominal cramping. No nausea. No dyspnea. No pleurisy.    Review Of Systems:   Review of Systems   Constitutional: Positive for activity change, fatigue and fever.   HENT: Negative.    Respiratory: Negative.    Cardiovascular: Negative.    Gastrointestinal: Positive for abdominal distention, abdominal pain, diarrhea and nausea.   Genitourinary: Negative.    Neurological: Negative.    Hematological: Negative.          Objective      Temp:  [99.1 °F (37.3 °C)-102.9 °F (39.4 °C)] 99.2 °F (37.3 °C)  Heart Rate:  [] 76  Resp:  [16-20] 16  BP: (116-149)/(64-86) 124/71  Physical Exam   Constitutional: He is oriented to person, place, and time. He appears well-nourished.   HENT:   Head: Normocephalic and atraumatic.   Mouth/Throat: Oropharynx is clear and moist.   Eyes: Conjunctivae are normal. Pupils are equal, round, and reactive to light.   Neck: Normal range of motion. No thyromegaly present.   Cardiovascular: Normal rate, regular rhythm and normal heart sounds.    Pulmonary/Chest: Effort normal and breath sounds normal. No respiratory distress. He has no wheezes.   Abdominal: Soft. Bowel sounds are normal. He exhibits distension. There is tenderness. There is no guarding.   Musculoskeletal: Normal range of motion. He exhibits edema.   Lymphadenopathy:     He has no cervical adenopathy.   Neurological: He is alert and oriented to person, place, and time.   Skin: Skin is warm.   Vitals reviewed.        Results Review:    I have reviewed the labs, radiology results and diagnostic studies.      Results from last 7 days  Lab Units 06/08/17  0338   WBC 10*3/mm3 7.78   HEMOGLOBIN g/dL 13.6   PLATELETS 10*3/mm3 155       Results from last 7 days  Lab Units 06/08/17  0338    SODIUM mmol/L 137   POTASSIUM mmol/L 3.3*   CHLORIDE mmol/L 107   TOTAL CO2 mmol/L 23.0   BUN mg/dL 9   CREATININE mg/dL 0.90   GLUCOSE mg/dL 223*   CALCIUM mg/dL 7.7*       Culture Data: Cultures:    Blood Culture   Date Value Ref Range Status   06/07/2017 No growth at less than 24 hours  Preliminary   06/07/2017 No growth at less than 24 hours  Preliminary       Radiology Data:     I have reviewed the medications.    Assessment/Plan     Problem List  Hospital Problem List     * (Principal)SIRS (systemic inflammatory response syndrome)    Leukocytosis    Abdominal pain    Lactate blood increased    Alkaline phosphatase elevation    Obese    Type 2 diabetes mellitus without complication        Enteritis  --check stool studies  --monitor fevers/labs  DM  SIRS  Leukocytosis      DVT prophylaxis:  Discharge Planning: I expect patient to be discharged tomorrow.    Meet Herrera MD   06/08/17   8:15 AM

## 2017-06-09 VITALS
DIASTOLIC BLOOD PRESSURE: 83 MMHG | TEMPERATURE: 97.9 F | SYSTOLIC BLOOD PRESSURE: 129 MMHG | OXYGEN SATURATION: 96 % | RESPIRATION RATE: 18 BRPM | WEIGHT: 215.6 LBS | HEIGHT: 70 IN | HEART RATE: 66 BPM | BODY MASS INDEX: 30.86 KG/M2

## 2017-06-09 LAB
ALBUMIN SERPL-MCNC: 3.1 G/DL (ref 3.2–4.8)
ALBUMIN/GLOB SERPL: 1 G/DL (ref 1.5–2.5)
ALP SERPL-CCNC: 63 U/L (ref 25–100)
ALT SERPL W P-5'-P-CCNC: 29 U/L (ref 7–40)
ANION GAP SERPL CALCULATED.3IONS-SCNC: 7 MMOL/L (ref 3–11)
AST SERPL-CCNC: 21 U/L (ref 0–33)
BASOPHILS # BLD AUTO: 0.01 10*3/MM3 (ref 0–0.2)
BASOPHILS NFR BLD AUTO: 0.1 % (ref 0–1)
BILIRUB SERPL-MCNC: 0.6 MG/DL (ref 0.3–1.2)
BUN BLD-MCNC: 9 MG/DL (ref 9–23)
BUN/CREAT SERPL: 10 (ref 7–25)
CALCIUM SPEC-SCNC: 8.3 MG/DL (ref 8.7–10.4)
CHLORIDE SERPL-SCNC: 104 MMOL/L (ref 99–109)
CO2 SERPL-SCNC: 27 MMOL/L (ref 20–31)
CREAT BLD-MCNC: 0.9 MG/DL (ref 0.6–1.3)
DEPRECATED RDW RBC AUTO: 42 FL (ref 37–54)
EOSINOPHIL # BLD AUTO: 0.23 10*3/MM3 (ref 0.1–0.3)
EOSINOPHIL NFR BLD AUTO: 3.4 % (ref 0–3)
ERYTHROCYTE [DISTWIDTH] IN BLOOD BY AUTOMATED COUNT: 13.1 % (ref 11.3–14.5)
GFR SERPL CREATININE-BSD FRML MDRD: 91 ML/MIN/1.73
GLOBULIN UR ELPH-MCNC: 3 GM/DL
GLUCOSE BLD-MCNC: 170 MG/DL (ref 70–100)
GLUCOSE BLDC GLUCOMTR-MCNC: 164 MG/DL (ref 70–130)
HCT VFR BLD AUTO: 38.7 % (ref 38.9–50.9)
HGB BLD-MCNC: 12.9 G/DL (ref 13.1–17.5)
IMM GRANULOCYTES # BLD: 0.01 10*3/MM3 (ref 0–0.03)
IMM GRANULOCYTES NFR BLD: 0.1 % (ref 0–0.6)
LYMPHOCYTES # BLD AUTO: 1.95 10*3/MM3 (ref 0.6–4.8)
LYMPHOCYTES NFR BLD AUTO: 29.1 % (ref 24–44)
MCH RBC QN AUTO: 28.9 PG (ref 27–31)
MCHC RBC AUTO-ENTMCNC: 33.3 G/DL (ref 32–36)
MCV RBC AUTO: 86.8 FL (ref 80–99)
MONOCYTES # BLD AUTO: 0.71 10*3/MM3 (ref 0–1)
MONOCYTES NFR BLD AUTO: 10.6 % (ref 0–12)
NEUTROPHILS # BLD AUTO: 3.78 10*3/MM3 (ref 1.5–8.3)
NEUTROPHILS NFR BLD AUTO: 56.7 % (ref 41–71)
PLATELET # BLD AUTO: 139 10*3/MM3 (ref 150–450)
PMV BLD AUTO: 8.7 FL (ref 6–12)
POTASSIUM BLD-SCNC: 3.4 MMOL/L (ref 3.5–5.5)
PROT SERPL-MCNC: 6.1 G/DL (ref 5.7–8.2)
RBC # BLD AUTO: 4.46 10*6/MM3 (ref 4.2–5.76)
SODIUM BLD-SCNC: 138 MMOL/L (ref 132–146)
WBC NRBC COR # BLD: 6.69 10*3/MM3 (ref 3.5–10.8)

## 2017-06-09 PROCEDURE — 82962 GLUCOSE BLOOD TEST: CPT

## 2017-06-09 PROCEDURE — 99217 PR OBSERVATION CARE DISCHARGE MANAGEMENT: CPT | Performed by: HOSPITALIST

## 2017-06-09 PROCEDURE — 85025 COMPLETE CBC W/AUTO DIFF WBC: CPT | Performed by: HOSPITALIST

## 2017-06-09 PROCEDURE — G0378 HOSPITAL OBSERVATION PER HR: HCPCS

## 2017-06-09 PROCEDURE — 25010000002 ENOXAPARIN PER 10 MG: Performed by: INTERNAL MEDICINE

## 2017-06-09 PROCEDURE — 80053 COMPREHEN METABOLIC PANEL: CPT | Performed by: HOSPITALIST

## 2017-06-09 PROCEDURE — 25010000002 PIPERACILLIN-TAZOBACTAM: Performed by: INTERNAL MEDICINE

## 2017-06-09 PROCEDURE — 96372 THER/PROPH/DIAG INJ SC/IM: CPT

## 2017-06-09 RX ORDER — SACCHAROMYCES BOULARDII 250 MG
250 CAPSULE ORAL 2 TIMES DAILY
Qty: 10 CAPSULE | Refills: 0 | Status: SHIPPED | OUTPATIENT
Start: 2017-06-09 | End: 2017-06-14

## 2017-06-09 RX ADMIN — Medication 250 MG: at 08:30

## 2017-06-09 RX ADMIN — INSULIN LISPRO 2 UNITS: 100 INJECTION, SOLUTION INTRAVENOUS; SUBCUTANEOUS at 08:30

## 2017-06-09 RX ADMIN — PIPERACILLIN SODIUM,TAZOBACTAM SODIUM 3.38 G: 3; .375 INJECTION, POWDER, FOR SOLUTION INTRAVENOUS at 05:34

## 2017-06-09 RX ADMIN — ENOXAPARIN SODIUM 40 MG: 40 INJECTION SUBCUTANEOUS at 05:33

## 2017-06-09 NOTE — DISCHARGE SUMMARY
[facility] Medicine Services  DISCHARGE SUMMARY       Date of Admission: 6/7/2017  Date of Discharge:  6/9/2017  Primary Care Physician: Juaquin Smith MD  Consulting Physician(s)          None           Discharge Diagnoses:  Active Hospital Problems (** Indicates Principal Problem)    Diagnosis Date Noted   • **SIRS (systemic inflammatory response syndrome) [R65.10] 06/07/2017   • Leukocytosis [D72.829] 06/07/2017   • Abdominal pain [R10.9] 06/07/2017   • Lactate blood increased [E87.2] 06/07/2017   • Alkaline phosphatase elevation [R74.8] 06/07/2017   • Obese [E66.9] 06/07/2017   • Type 2 diabetes mellitus without complication [E11.9] 06/07/2017      Resolved Hospital Problems    Diagnosis Date Noted Date Resolved   No resolved problems to display.       Presenting Problem/History of Present Illness  Sepsis, due to unspecified organism [A41.9]  Sepsis, due to unspecified organism [A41.9]     Chief Complaint on Day of Discharge:  NONE    History of Present Illness on Day of Discharge:     No n/v/diarrhea. No abdominal cramping. No f/c. Ready to go home.    Hospital Course  Patient is a 47 y.o. male presented with abdominal cramping and diarrhea.    Viral enteritis v. Food borne illness  --He was admitted with diarrhea and abdominal pain. He was started on IV abx and stool studies were sent. C diff and stool WBC were negative. His symptoms resolved. He no longer needs antibiotics.   DM  --He will restart his unspecified DM medication upon returning home.    Procedures Performed         Consults:   Consults     No orders found from 5/9/2017 to 6/8/2017.          Pertinent Test Results:      Results        Procedure Component Value Units Date/Time       Comprehensive Metabolic Panel [[id number]] (Abnormal) Collected: 06/09/17 0520       Lab Status: Final result Specimen: Blood Updated: 06/09/17 0644        Glucose 170 (H) mg/dL         BUN 9 mg/dL         Creatinine 0.90 mg/dL         Sodium  138 mmol/L         Potassium 3.4 (L) mmol/L         Chloride 104 mmol/L         CO2 27.0 mmol/L         Calcium 8.3 (L) mg/dL         Total Protein 6.1 g/dL         Albumin 3.10 (L) g/dL         ALT (SGPT) 29 U/L         AST (SGOT) 21 U/L         Alkaline Phosphatase 63 U/L         Total Bilirubin 0.6 mg/dL         eGFR Non African Amer 91 mL/min/1.73         Globulin 3.0 gm/dL         A/G Ratio 1.0 (L) g/dL         BUN/Creatinine Ratio 10.0        Anion Gap 7.0 mmol/L        Narrative:         National Kidney Foundation Guidelines    Stage     Description        GFR  1         Normal or High     90+  2         Mild decrease      60-89  3         Moderate decrease  30-59  4         Severe decrease    15-29  5         Kidney failure     <15       CBC Auto Differential [[id number]] (Abnormal) Collected: 06/09/17 0520       Lab Status: Final result Specimen: Blood Updated: 06/09/17 0630        WBC 6.69 10*3/mm3         RBC 4.46 10*6/mm3         Hemoglobin 12.9 (L) g/dL         Hematocrit 38.7 (L) %         MCV 86.8 fL         MCH 28.9 pg         MCHC 33.3 g/dL         RDW 13.1 %         RDW-SD 42.0 fl         MPV 8.7 fL         Platelets 139 (L) 10*3/mm3         Neutrophil % 56.7 %         Lymphocyte % 29.1 %         Monocyte % 10.6 %         Eosinophil % 3.4 (H) %         Basophil % 0.1 %         Immature Grans % 0.1 %         Neutrophils, Absolute 3.78 10*3/mm3         Lymphocytes, Absolute 1.95 10*3/mm3         Monocytes, Absolute 0.71 10*3/mm3         Eosinophils, Absolute 0.23 10*3/mm3         Basophils, Absolute 0.01 10*3/mm3         Immature Grans, Absolute 0.01 10*3/mm3        Stool Culture [[id number]] Collected: 06/08/17 1300       Lab Status: In process Specimen: Stool from Per Rectum Updated: 06/08/17 1329       Clostridium Difficile Toxin, PCR [[id number]] (Normal) Collected: 06/08/17 1300       Lab Status: Final result Specimen: Stool from Per Rectum Updated: 06/08/17 1708        C. Difficile Toxins by PCR  Not Detected       Narrative:           Performance characteristics of test not established for patients <2 years of age.  Negative for Toxigenic C. Difficile       Fecal Leukocytes [[id number]] (Normal) Collected: 06/08/17 1300       Lab Status: Final result Specimen: Stool from Per Rectum Updated: 06/08/17 1354        Fecal Leukocytes No WBC's Seen       CBC (No Diff) [[id number]] (Normal) Collected: 06/08/17 0338       Lab Status: Final result Specimen: Blood Updated: 06/08/17 0515        WBC 7.78 10*3/mm3         RBC 4.65 10*6/mm3         Hemoglobin 13.6 g/dL         Hematocrit 40.3 %         MCV 86.7 fL         MCH 29.2 pg         MCHC 33.7 g/dL         RDW 13.1 %         RDW-SD 41.5 fl         MPV 8.9 fL         Platelets 155 10*3/mm3        Basic Metabolic Panel [[id number]] (Abnormal) Collected: 06/08/17 0338       Lab Status: Final result Specimen: Blood Updated: 06/08/17 0528        Glucose 223 (H) mg/dL         BUN 9 mg/dL         Creatinine 0.90 mg/dL         Sodium 137 mmol/L         Potassium 3.3 (L) mmol/L         Chloride 107 mmol/L         CO2 23.0 mmol/L         Calcium 7.7 (L) mg/dL         eGFR Non African Amer 91 mL/min/1.73         BUN/Creatinine Ratio 10.0        Anion Gap 7.0 mmol/L        Narrative:         National Kidney Foundation Guidelines    Stage     Description        GFR  1         Normal or High     90+  2         Mild decrease      60-89  3         Moderate decrease  30-59  4         Severe decrease    15-29  5         Kidney failure     <15       Lactic Acid, Plasma [[id number]] (Abnormal) Collected: 06/07/17 2116       Lab Status: Final result Specimen: Blood Updated: 06/07/17 2139        Lactate 2.3 (C) mmol/L        Lactate Acid, Reflex [[id number]] (Abnormal) Collected: 06/07/17 1544       Lab Status: Final result Specimen: Blood Updated: 06/07/17 1651        Lactate 2.5 (C) mmol/L        Rapid Strep A Screen [[id number]] (Normal) Collected: 06/07/17 1257       Lab Status: Final  result Specimen: Swab from Throat Updated: 06/07/17 1319        Strep A Ag Negative       Narrative:           Test performed by Direct Antigen Testing.       Influenza Antigen [[id number]] (Normal) Collected: 06/07/17 1257       Lab Status: Final result Specimen: Swab from Nasopharynx Updated: 06/07/17 1325        Influenza A Ag, EIA Negative        Influenza B Ag, EIA Negative       Beta Strep Culture, Throat [[id number]] (Normal) Collected: 06/07/17 1257       Lab Status: Preliminary result Specimen: Swab from Throat Updated: 06/08/17 0722        Throat Culture, Beta Strep No Beta Hemolytic Streptococcus Isolated       Urinalysis With / Culture If Indicated [[id number]] (Abnormal) Collected: 06/07/17 1236       Lab Status: Final result Specimen: Urine from Urine, Clean Catch Updated: 06/07/17 1252        Color, UA Yellow        Appearance, UA Clear        pH, UA <=5.0        Specific Gravity, UA >=1.030        Glucose, UA >=1000 mg/dL (3+) (A)        Ketones, UA 15 mg/dL (1+) (A)        Bilirubin, UA Negative        Blood, UA Negative        Protein, UA Negative        Leuk Esterase, UA Negative        Nitrite, UA Negative        Urobilinogen, UA 0.2 E.U./dL       Narrative:         Urine microscopic not indicated.       Blood Culture [[id number]] (Normal) Collected: 06/07/17 1230       Lab Status: Preliminary result Specimen: Blood from Arm, Left Updated: 06/08/17 1301        Blood Culture No growth at 24 hours       Blood Culture [[id number]] (Normal) Collected: 06/07/17 1225       Lab Status: Preliminary result Specimen: Blood from Arm, Left Updated: 06/08/17 1301        Blood Culture No growth at 24 hours       [place] Draw [[id number]] Collected: 06/07/17 1145       Lab Status: Final result Specimen: Blood Updated: 06/07/17 1301       Narrative:         The following orders were created for panel order [place] Draw.  Procedure                               Abnormality         Status                     ---------                                -----------         ------                     Light Blue Top[[id number]]                                    Final result               Green Top (Gel)[[id number]]                                   Final result               Lavender Top[[id number]]                                      Final result               Gold Top - SST[[id number]]                                    Final result               Green Top (No Gel)[[id number]]                                                             Please view results for these tests on the individual orders.       Comprehensive Metabolic Panel [[id number]] (Abnormal) Collected: 06/07/17 1145       Lab Status: Final result Specimen: Blood Updated: 06/07/17 1223        Glucose 236 (H) mg/dL         BUN 13 mg/dL         Creatinine 1.00 mg/dL         Sodium 136 mmol/L         Potassium 4.0 mmol/L         Chloride 102 mmol/L         CO2 28.0 mmol/L         Calcium 9.6 mg/dL         Total Protein 8.7 (H) g/dL         Albumin 4.40 g/dL         ALT (SGPT) 36 U/L         AST (SGOT) 25 U/L         Alkaline Phosphatase 106 (H) U/L         Total Bilirubin 1.4 (H) mg/dL         eGFR Non African Amer 80 mL/min/1.73         Globulin 4.3 gm/dL         A/G Ratio 1.0 (L) g/dL         BUN/Creatinine Ratio 13.0        Anion Gap 6.0 mmol/L        Narrative:         National Kidney Foundation Guidelines    Stage     Description        GFR  1         Normal or High     90+  2         Mild decrease      60-89  3         Moderate decrease  30-59  4         Severe decrease    15-29  5         Kidney failure     <15       Lipase [[id number]] (Normal) Collected: 06/07/17 1145       Lab Status: Final result Specimen: Blood Updated: 06/07/17 1223        Lipase 29 U/L        CBC Auto Differential [[id number]] (Abnormal) Collected: 06/07/17 1145       Lab Status: Final result Specimen: Blood Updated: 06/07/17 1207        WBC 16.04 (H) 10*3/mm3         RBC 5.78 (H) 10*6/mm3         Hemoglobin  "17.3 g/dL         Hematocrit 49.3 %         MCV 85.3 fL         MCH 29.9 pg         MCHC 35.1 g/dL         RDW 12.9 %         RDW-SD 40.2 fl         MPV 8.9 fL         Platelets 194 10*3/mm3         Neutrophil % 93.7 (H) %         Lymphocyte % 2.9 (L) %         Monocyte % 2.7 %         Eosinophil % 0.4 %         Basophil % 0.1 %         Immature Grans % 0.2 %         Neutrophils, Absolute 15.04 (H) 10*3/mm3         Lymphocytes, Absolute 0.46 (L) 10*3/mm3         Monocytes, Absolute 0.44 10*3/mm3         Eosinophils, Absolute 0.06 (L) 10*3/mm3         Basophils, Absolute 0.01 10*3/mm3         Immature Grans, Absolute 0.03 10*3/mm3        Lactic Acid, Plasma [[id number]] (Abnormal) Collected: 06/07/17 1145       Lab Status: Final result Specimen: Blood Updated: 06/07/17 1226        Lactate 2.4 (C) mmol/L        Procalcitonin [[id number]] Collected: 06/07/17 1145       Lab Status: Final result Specimen: Blood Updated: 06/07/17 1459        Procalcitonin 0.33 ng/mL        Narrative:         As a Marker for Sepsis (Non-Neonates):   1. <0.5 ng/mL represents a low risk of severe sepsis and/or septic shock.  2. >2 ng/mL represents a high risk of severe sepsis and/or septic shock.    As a Marker for Lower Respiratory Tract Infections that require antibiotic therapy:          Condition on Discharge:  Stable/improved    Physical Exam on Discharge:/83 (BP Location: Left arm, Patient Position: Lying)  Pulse 66  Temp 97.9 °F (36.6 °C) (Temporal Artery )   Resp 18  Ht 70\" (177.8 cm)  Wt 215 lb 9.6 oz (97.8 kg)  SpO2 96%  BMI 30.94 kg/m2  Physical Exam  NAD  OP clear  MMM  Neck supple  RRR  Chest clear  +BS, ND, NT  FRAGA  No rashes    Discharge Disposition  Home or Self Care    Discharge Medications   Gagan Oconnell   Home Medication Instructions DARRYL:[id number]    Printed on:06/09/17 0804   Medication Information                      saccharomyces boulardii (FLORASTOR) 250 MG capsule  Take 1 capsule by mouth 2 (Two) " Times a Day for 5 days.                 Discharge Diet:   Diet Instructions     Diet: Regular, Consistent Carbohydrate; Thin Liquids, No Restrictions       Discharge Diet:   Regular  Consistent Carbohydrate      Fluid Consistency:  Thin Liquids, No Restrictions                 Discharge Care Plan / Instructions:    Activity at Discharge:   Activity Instructions     Activity as Tolerated                     Follow-up Appointments  No future appointments.  Additional Instructions for the Follow-ups that You Need to Schedule     Additional Discharge Follow-Up (Specify Provider)    As directed    To:  Luis 1-2 weeks                 Test Results Pending at Discharge   Order Current Status    Stool Culture In process    Beta Strep Culture, Throat Preliminary result    Blood Culture Preliminary result    Blood Culture Preliminary result           Meet Herrera MD 06/09/17 8:04 AM    32 minutes    Please note that portions of this note may have been completed with a voice recognition program. Efforts were made to edit the dictations, but occasionally words are mistranscribed.

## 2017-06-09 NOTE — PLAN OF CARE
Problem: Patient Care Overview (Adult)  Goal: Plan of Care Review  Outcome: Ongoing (interventions implemented as appropriate)    06/09/17 0814   Coping/Psychosocial Response Interventions   Plan Of Care Reviewed With patient   Patient Care Overview   Progress improving

## 2017-06-09 NOTE — PLAN OF CARE
Problem: Infection, Risk/Actual (Adult)  Goal: Identify Related Risk Factors and Signs and Symptoms  Outcome: Ongoing (interventions implemented as appropriate)    06/09/17 0814   Infection, Risk/Actual   Infection, Risk/Actual: Related Risk Factors blood disorder   Signs and Symptoms (Infection, Risk/Actual) blood glucose changes

## 2017-06-10 LAB
BACTERIA SPEC AEROBE CULT: ABNORMAL
STREP GROUPING: ABNORMAL

## 2017-06-11 LAB — BACTERIA SPEC AEROBE CULT: NORMAL

## 2017-06-12 LAB
BACTERIA SPEC AEROBE CULT: NORMAL
BACTERIA SPEC AEROBE CULT: NORMAL

## 2018-02-28 ENCOUNTER — OFFICE VISIT (OUTPATIENT)
Dept: FAMILY MEDICINE CLINIC | Facility: CLINIC | Age: 48
End: 2018-02-28

## 2018-02-28 VITALS
HEART RATE: 79 BPM | WEIGHT: 215 LBS | BODY MASS INDEX: 30.85 KG/M2 | SYSTOLIC BLOOD PRESSURE: 128 MMHG | DIASTOLIC BLOOD PRESSURE: 92 MMHG | OXYGEN SATURATION: 97 %

## 2018-02-28 DIAGNOSIS — E66.09 CLASS 1 OBESITY DUE TO EXCESS CALORIES WITH SERIOUS COMORBIDITY AND BODY MASS INDEX (BMI) OF 30.0 TO 30.9 IN ADULT: ICD-10-CM

## 2018-02-28 DIAGNOSIS — I10 ESSENTIAL HYPERTENSION: ICD-10-CM

## 2018-02-28 DIAGNOSIS — E11.9 TYPE 2 DIABETES MELLITUS WITHOUT COMPLICATION, WITHOUT LONG-TERM CURRENT USE OF INSULIN (HCC): Primary | ICD-10-CM

## 2018-02-28 PROBLEM — R65.10 SIRS (SYSTEMIC INFLAMMATORY RESPONSE SYNDROME) (HCC): Status: RESOLVED | Noted: 2017-06-07 | Resolved: 2018-02-28

## 2018-02-28 PROBLEM — R79.89 LACTATE BLOOD INCREASED: Status: RESOLVED | Noted: 2017-06-07 | Resolved: 2018-02-28

## 2018-02-28 LAB — HBA1C MFR BLD: 9.3 %

## 2018-02-28 PROCEDURE — 99214 OFFICE O/P EST MOD 30 MIN: CPT | Performed by: NURSE PRACTITIONER

## 2018-02-28 PROCEDURE — 83036 HEMOGLOBIN GLYCOSYLATED A1C: CPT | Performed by: NURSE PRACTITIONER

## 2018-02-28 RX ORDER — LISINOPRIL 5 MG/1
5 TABLET ORAL DAILY
Qty: 30 TABLET | Refills: 2 | Status: SHIPPED | OUTPATIENT
Start: 2018-02-28 | End: 2021-04-06

## 2018-02-28 RX ORDER — GLIPIZIDE 5 MG/1
5 TABLET, FILM COATED, EXTENDED RELEASE ORAL DAILY
Qty: 30 TABLET | Refills: 2 | Status: SHIPPED | OUTPATIENT
Start: 2018-02-28 | End: 2021-04-06

## 2018-02-28 NOTE — PATIENT INSTRUCTIONS
Calorie Counting for Weight Loss  Calories are units of energy. Your body needs a certain amount of calories from food to keep you going throughout the day. When you eat more calories than your body needs, your body stores the extra calories as fat. When you eat fewer calories than your body needs, your body burns fat to get the energy it needs.  Calorie counting means keeping track of how many calories you eat and drink each day. Calorie counting can be helpful if you need to lose weight. If you make sure to eat fewer calories than your body needs, you should lose weight. Ask your health care provider what a healthy weight is for you.  For calorie counting to work, you will need to eat the right number of calories in a day in order to lose a healthy amount of weight per week. A dietitian can help you determine how many calories you need in a day and will give you suggestions on how to reach your calorie goal.  · A healthy amount of weight to lose per week is usually 1-2 lb (0.5-0.9 kg). This usually means that your daily calorie intake should be reduced by 500-750 calories.  · Eating 1,200 - 1,500 calories per day can help most women lose weight.  · Eating 1,500 - 1,800 calories per day can help most men lose weight.  What is my plan?  My goal is to have __________ calories per day.  If I have this many calories per day, I should lose around __________ pounds per week.  What do I need to know about calorie counting?  In order to meet your daily calorie goal, you will need to:  · Find out how many calories are in each food you would like to eat. Try to do this before you eat.  · Decide how much of the food you plan to eat.  · Write down what you ate and how many calories it had. Doing this is called keeping a food log.  To successfully lose weight, it is important to balance calorie counting with a healthy lifestyle that includes regular activity. Aim for 150 minutes of moderate exercise (such as walking) or 75  minutes of vigorous exercise (such as running) each week.  Where do I find calorie information?     The number of calories in a food can be found on a Nutrition Facts label. If a food does not have a Nutrition Facts label, try to look up the calories online or ask your dietitian for help.  Remember that calories are listed per serving. If you choose to have more than one serving of a food, you will have to multiply the calories per serving by the amount of servings you plan to eat. For example, the label on a package of bread might say that a serving size is 1 slice and that there are 90 calories in a serving. If you eat 1 slice, you will have eaten 90 calories. If you eat 2 slices, you will have eaten 180 calories.  How do I keep a food log?  Immediately after each meal, record the following information in your food log:  · What you ate. Don't forget to include toppings, sauces, and other extras on the food.  · How much you ate. This can be measured in cups, ounces, or number of items.  · How many calories each food and drink had.  · The total number of calories in the meal.  Keep your food log near you, such as in a small notebook in your pocket, or use a mobile omer or website. Some programs will calculate calories for you and show you how many calories you have left for the day to meet your goal.  What are some calorie counting tips?  · Use your calories on foods and drinks that will fill you up and not leave you hungry:  ¨ Some examples of foods that fill you up are nuts and nut butters, vegetables, lean proteins, and high-fiber foods like whole grains. High-fiber foods are foods with more than 5 g fiber per serving.  ¨ Drinks such as sodas, specialty coffee drinks, alcohol, and juices have a lot of calories, yet do not fill you up.  · Eat nutritious foods and avoid empty calories. Empty calories are calories you get from foods or beverages that do not have many vitamins or protein, such as candy, sweets, and  "soda. It is better to have a nutritious high-calorie food (such as an avocado) than a food with few nutrients (such as a bag of chips).  · Know how many calories are in the foods you eat most often. This will help you calculate calorie counts faster.  · Pay attention to calories in drinks. Low-calorie drinks include water and unsweetened drinks.  · Pay attention to nutrition labels for \"low fat\" or \"fat free\" foods. These foods sometimes have the same amount of calories or more calories than the full fat versions. They also often have added sugar, starch, or salt, to make up for flavor that was removed with the fat.  · Find a way of tracking calories that works for you. Get creative. Try different apps or programs if writing down calories does not work for you.  What are some portion control tips?  · Know how many calories are in a serving. This will help you know how many servings of a certain food you can have.  · Use a measuring cup to measure serving sizes. You could also try weighing out portions on a kitchen scale. With time, you will be able to estimate serving sizes for some foods.  · Take some time to put servings of different foods on your favorite plates, bowls, and cups so you know what a serving looks like.  · Try not to eat straight from a bag or box. Doing this can lead to overeating. Put the amount you would like to eat in a cup or on a plate to make sure you are eating the right portion.  · Use smaller plates, glasses, and bowls to prevent overeating.  · Try not to multitask (for example, watch TV or use your computer) while eating. If it is time to eat, sit down at a table and enjoy your food. This will help you to know when you are full. It will also help you to be aware of what you are eating and how much you are eating.  What are tips for following this plan?  Reading food labels   · Check the calorie count compared to the serving size. The serving size may be smaller than what you are used to " eating.  · Check the source of the calories. Make sure the food you are eating is high in vitamins and protein and low in saturated and trans fats.  Shopping   · Read nutrition labels while you shop. This will help you make healthy decisions before you decide to purchase your food.  · Make a grocery list and stick to it.  Cooking   · Try to cook your favorite foods in a healthier way. For example, try baking instead of frying.  · Use low-fat dairy products.  Meal planning   · Use more fruits and vegetables. Half of your plate should be fruits and vegetables.  · Include lean proteins like poultry and fish.  How do I count calories when eating out?  · Ask for smaller portion sizes.  · Consider sharing an entree and sides instead of getting your own entree.  · If you get your own entree, eat only half. Ask for a box at the beginning of your meal and put the rest of your entree in it so you are not tempted to eat it.  · If calories are listed on the menu, choose the lower calorie options.  · Choose dishes that include vegetables, fruits, whole grains, low-fat dairy products, and lean protein.  · Choose items that are boiled, broiled, grilled, or steamed. Stay away from items that are buttered, battered, fried, or served with cream sauce. Items labeled “crispy” are usually fried, unless stated otherwise.  · Choose water, low-fat milk, unsweetened iced tea, or other drinks without added sugar. If you want an alcoholic beverage, choose a lower calorie option such as a glass of wine or light beer.  · Ask for dressings, sauces, and syrups on the side. These are usually high in calories, so you should limit the amount you eat.  · If you want a salad, choose a garden salad and ask for grilled meats. Avoid extra toppings like chatman, cheese, or fried items. Ask for the dressing on the side, or ask for olive oil and vinegar or lemon to use as dressing.  · Estimate how many servings of a food you are given. For example, a serving  of cooked rice is ½ cup or about the size of half a baseball. Knowing serving sizes will help you be aware of how much food you are eating at restaurants. The list below tells you how big or small some common portion sizes are based on everyday objects:  ¨ 1 oz--4 stacked dice.  ¨ 3 oz--1 deck of cards.  ¨ 1 tsp--1 die.  ¨ 1 Tbsp--½ a ping-pong ball.  ¨ 2 Tbsp--1 ping-pong ball.  ¨ ½ cup--½ baseball.  ¨ 1 cup--1 baseball.  Summary  · Calorie counting means keeping track of how many calories you eat and drink each day. If you eat fewer calories than your body needs, you should lose weight.  · A healthy amount of weight to lose per week is usually 1-2 lb (0.5-0.9 kg). This usually means reducing your daily calorie intake by 500-750 calories.  · The number of calories in a food can be found on a Nutrition Facts label. If a food does not have a Nutrition Facts label, try to look up the calories online or ask your dietitian for help.  · Use your calories on foods and drinks that will fill you up, and not on foods and drinks that will leave you hungry.  · Use smaller plates, glasses, and bowls to prevent overeating.  This information is not intended to replace advice given to you by your health care provider. Make sure you discuss any questions you have with your health care provider.  Document Released: 12/18/2006 Document Revised: 11/17/2017 Document Reviewed: 11/17/2017  [employer] Interactive Patient Education © 2017 [employer]  Diabetes Mellitus and Standards of Medical Care  Managing diabetes (diabetes mellitus) can be complicated. Your diabetes treatment may be managed by a team of health care providers, including:  · A diet and nutrition specialist (registered dietitian).  · A nurse.  · A certified diabetes educator (CDE).  · A diabetes specialist (endocrinologist).  · An eye doctor.  · A primary care provider.  · A dentist.  Your health care providers follow a schedule in order to help you get the best quality  of care. The following schedule is a general guideline for your diabetes management plan. Your health care providers may also give you more specific instructions.  HbA1c (  hemoglobin A1c) test  This test provides information about blood sugar (glucose) control over the previous 2-3 months. It is used to check whether your diabetes management plan needs to be adjusted.  · If you are meeting your treatment goals, this test is done at least 2 times a year.  · If you are not meeting treatment goals or if your treatment goals have changed, this test is done 4 times a year.  Blood pressure test  · This test is done at every routine medical visit. For most people, the goal is less than 130/80. Ask your health care provider what your goal blood pressure should be.  Dental and eye exams  · Visit your dentist two times a year.  · If you have type 1 diabetes, get an eye exam 3-5 years after you are diagnosed, and then once a year after your first exam.  ¨ If you were diagnosed with type 1 diabetes as a child, get an eye exam when you are age 10 or older and have had diabetes for 3-5 years. After the first exam, you should get an eye exam once a year.  · If you have type 2 diabetes, have an eye exam as soon as you are diagnosed, and then once a year after your first exam.  Foot care exam  · Visual foot exams are done at every routine medical visit. The exams check for cuts, bruises, redness, blisters, sores, or other problems with the feet.  · A complete foot exam is done by your health care provider once a year. This exam includes an inspection of the structure and skin of your feet, and a check of the pulses and sensation in your feet.  ¨ Type 1 diabetes: Get your first exam 3-5 years after diagnosis.  ¨ Type 2 diabetes: Get your first exam as soon as you are diagnosed.  · Check your feet every day for cuts, bruises, redness, blisters, or sores. If you have any of these or other problems that are not healing, contact your  health care provider.  Kidney function test (  urine microalbumin)  · This test is done once a year.  ¨ Type 1 diabetes: Get your first test 5 years after diagnosis.  ¨ Type 2 diabetes: Get your first test as soon as you are diagnosed.  · If you have chronic kidney disease (CKD), get a serum creatinine and estimated glomerular filtration rate (eGFR) test once a year.  Lipid profile (cholesterol, HDL, LDL, triglycerides)  · This test should be done when you are diagnosed with diabetes, and every 5 years after the first test. If you are on medicines to lower your cholesterol, you may need to get this test done every year.  ¨ The goal for LDL is less than 100 mg/dL (5.5 mmol/L). If you are at high risk, the goal is less than 70 mg/dL (3.9 mmol/L).  ¨   ¨ The goal for triglycerides is less than 150 mg/dL (8.3 mmol/L).  Immunizations  · The yearly flu (influenza) vaccine is recommended for everyone 6 months or older who has diabetes.  · The pneumonia (pneumococcal) vaccine is recommended for everyone 2 years or older who has diabetes. If you are 65 or older, you may get the pneumonia vaccine as a series of two separate shots.  · The hepatitis B vaccine is recommended for adults shortly after they have been diagnosed with diabetes.  ·   ·   Mental and emotional health  · Screening for symptoms of eating disorders, anxiety, and depression is recommended at the time of diagnosis and afterward as needed. If your screening shows that you have symptoms (you have a positive screening result), you may need further evaluation and be referred to a mental health care provider.  Diabetes self-management education  · Education about how to manage your diabetes is recommended at diagnosis and ongoing as needed.  Treatment plan  · Your treatment plan will be reviewed at every medical visit.  Summary  · Managing diabetes (diabetes mellitus) can be complicated. Your diabetes treatment may be managed by a team of health care  providers.  · Your health care providers follow a schedule in order to help you get the best quality of care.  · Standards of care including having regular physical exams, blood tests, blood pressure monitoring, immunizations, screening tests, and education about how to manage your diabetes.  · Your health care providers may also give you more specific instructions based on your individual health.  This information is not intended to replace advice given to you by your health care provider. Make sure you discuss any questions you have with your health care provider.  Document Released: 10/15/2010 Document Revised: 09/15/2017 Document Reviewed: 09/15/2017  [employer] Interactive Patient Education © 2017 [employer]

## 2019-08-09 ENCOUNTER — HOSPITAL ENCOUNTER (EMERGENCY)
Facility: HOSPITAL | Age: 49
Discharge: HOME | End: 2019-08-09
Attending: EMERGENCY MEDICINE | Admitting: EMERGENCY MEDICINE

## 2019-08-09 VITALS
SYSTOLIC BLOOD PRESSURE: 140 MMHG | BODY MASS INDEX: 29.46 KG/M2 | HEART RATE: 84 BPM | WEIGHT: 205.8 LBS | RESPIRATION RATE: 16 BRPM | OXYGEN SATURATION: 98 % | DIASTOLIC BLOOD PRESSURE: 84 MMHG | HEIGHT: 70 IN | TEMPERATURE: 98.6 F

## 2019-08-09 DIAGNOSIS — T24.401A: Primary | ICD-10-CM

## 2019-08-09 PROCEDURE — 99283 EMERGENCY DEPT VISIT LOW MDM: CPT

## 2019-08-09 PROCEDURE — 90715 TDAP VACCINE 7 YRS/> IM: CPT | Performed by: EMERGENCY MEDICINE

## 2019-08-09 PROCEDURE — 90471 IMMUNIZATION ADMIN: CPT | Performed by: EMERGENCY MEDICINE

## 2019-08-09 PROCEDURE — 25010000002 TDAP 5-2.5-18.5 LF-MCG/0.5 SUSPENSION: Performed by: EMERGENCY MEDICINE

## 2019-08-09 RX ORDER — AMOXICILLIN AND CLAVULANATE POTASSIUM 875; 125 MG/1; MG/1
1 TABLET, FILM COATED ORAL 2 TIMES DAILY
Qty: 14 TABLET | Refills: 0 | Status: SHIPPED | OUTPATIENT
Start: 2019-08-09 | End: 2021-04-06

## 2019-08-09 RX ORDER — BACITRACIN ZINC 500 [USP'U]/G
OINTMENT TOPICAL ONCE
Status: COMPLETED | OUTPATIENT
Start: 2019-08-09 | End: 2019-08-09

## 2019-08-09 RX ADMIN — BACITRACIN ZINC: 500 OINTMENT TOPICAL at 15:14

## 2019-08-09 RX ADMIN — TETANUS TOXOID, REDUCED DIPHTHERIA TOXOID AND ACELLULAR PERTUSSIS VACCINE, ADSORBED 0.5 ML: 5; 2.5; 8; 8; 2.5 SUSPENSION INTRAMUSCULAR at 15:13

## 2019-08-09 NOTE — ED PROVIDER NOTES
Subjective   History of Present Illness    Chief Complaint: Rash to shin  History of Present Illness: Patient presents to rash to the right shin anteriorly.  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  States he noticed his pants were wet and concrete mix had adhered to his pants.  Showered afterwards.  Noticed a burning sensation to his leg yesterday with a superficial rash spread.  Now scabbed.  Unsure of last tetanus vaccine.  Onset: Yesterday  Duration: Persistent  Exacerbating / Alleviating factors: Soreness to touch including clothing touching the affected area  Associated symptoms: Scabbed areas      Nurses Notes reviewed and agree, including vitals, allergies, social history and prior medical history.     REVIEW OF SYSTEMS: All systems reviewed and not pertinent unless noted.    Positive for: Rash to right lower extremity    Negative for: Calf pain, leg swelling, streaking of area or   Review of Systems    Past Medical History:   Diagnosis Date   • Diabetes mellitus (CMS/[facility])        No Known Allergies    History reviewed. No pertinent surgical history.    Family History   Problem Relation Age of Onset   • No Known Problems Son        Social History     Socioeconomic History   • Marital status:      Spouse name: Not on file   • Number of children: Not on file   • Years of education: Not on file   • Highest education level: Not on file   Tobacco Use   • Smoking status: Never Smoker   • Smokeless tobacco: Never Used   Substance and Sexual Activity   • Alcohol use: No   • Drug use: No   • Sexual activity: Defer           Objective   Physical Exam      GENERAL APPEARANCE: Well developed, well nourished, in no acute distress.  VITAL SIGNS: per nursing, reviewed and noted  SKIN: Ill-defined pretibial scabbed areas of mild cellulitis to the right shin with some scabbing  EYES: perrla. EOMI.  ENT:  TM clear, posterior pharynx patent.  LUNGS:  normal breath sounds. No retractions.   CARDIOVASCULAR:  regular rate and  rhythm, no murmurs.  Good Peripheral pulses.  ABDOMEN: Soft, nontender, normal bowel sounds. No hernia. No ascites.  MUSCULOSKELETAL: Mild pretibial tenderness along the areas of affected contact dermatitis full ROM. Strength and tone normal.  No calf swelling  NEUROLOGIC: Alert, oriented x 3. No gross deficits.   NECK: Supple, symmetric. No tenderness, no masses. Full ROM  Back: full rom, no paraspinal spasm. No CVA tenderness.   : no bladder tenderness or distention, no CVA tenderness      Procedures     No attending provider procedures were performed      ED Course                  MDM  We will add Augmentin for antibiotic coverage, added bacitracin ointment updated tetanus advised outpatient follow-up and return precautions.    Final diagnoses:   Chemical burn of right lower extremity except ankle and foot, initial encounter            Geo Hernandez,   08/09/19 [id number]

## 2021-04-06 ENCOUNTER — OFFICE VISIT (OUTPATIENT)
Dept: INTERNAL MEDICINE | Facility: CLINIC | Age: 51
End: 2021-04-06

## 2021-04-06 ENCOUNTER — LAB (OUTPATIENT)
Dept: LAB | Facility: HOSPITAL | Age: 51
End: 2021-04-06

## 2021-04-06 VITALS
SYSTOLIC BLOOD PRESSURE: 144 MMHG | BODY MASS INDEX: 29.26 KG/M2 | RESPIRATION RATE: 18 BRPM | HEIGHT: 70 IN | HEART RATE: 75 BPM | WEIGHT: 204.4 LBS | OXYGEN SATURATION: 96 % | TEMPERATURE: 98.1 F | DIASTOLIC BLOOD PRESSURE: 82 MMHG

## 2021-04-06 DIAGNOSIS — Z71.3 DIETARY COUNSELING: ICD-10-CM

## 2021-04-06 DIAGNOSIS — R03.0 ELEVATED BP WITHOUT DIAGNOSIS OF HYPERTENSION: ICD-10-CM

## 2021-04-06 DIAGNOSIS — Z13.220 SCREENING, LIPID: ICD-10-CM

## 2021-04-06 DIAGNOSIS — R31.9 HEMATURIA, UNSPECIFIED TYPE: ICD-10-CM

## 2021-04-06 DIAGNOSIS — E11.9 TYPE 2 DIABETES MELLITUS WITHOUT COMPLICATION, WITHOUT LONG-TERM CURRENT USE OF INSULIN (HCC): Primary | ICD-10-CM

## 2021-04-06 DIAGNOSIS — Z13.29 SCREENING FOR ENDOCRINE DISORDER: ICD-10-CM

## 2021-04-06 DIAGNOSIS — Z12.5 SCREENING FOR PROSTATE CANCER: ICD-10-CM

## 2021-04-06 DIAGNOSIS — G89.29 CHRONIC PAIN OF RIGHT KNEE: ICD-10-CM

## 2021-04-06 DIAGNOSIS — R35.1 NOCTURIA: ICD-10-CM

## 2021-04-06 DIAGNOSIS — M25.561 CHRONIC PAIN OF RIGHT KNEE: ICD-10-CM

## 2021-04-06 DIAGNOSIS — E11.9 TYPE 2 DIABETES MELLITUS WITHOUT COMPLICATION, WITHOUT LONG-TERM CURRENT USE OF INSULIN (HCC): ICD-10-CM

## 2021-04-06 PROBLEM — R74.8 ALKALINE PHOSPHATASE ELEVATION: Status: RESOLVED | Noted: 2017-06-07 | Resolved: 2021-04-06

## 2021-04-06 PROBLEM — D72.829 LEUKOCYTOSIS: Status: RESOLVED | Noted: 2017-06-07 | Resolved: 2021-04-06

## 2021-04-06 LAB
ALBUMIN SERPL-MCNC: 4.1 G/DL (ref 3.5–5.2)
ALBUMIN/GLOB SERPL: 0.9 G/DL
ALP SERPL-CCNC: 102 U/L (ref 39–117)
ALT SERPL W P-5'-P-CCNC: 32 U/L (ref 1–41)
ANION GAP SERPL CALCULATED.3IONS-SCNC: 11.9 MMOL/L (ref 5–15)
AST SERPL-CCNC: 24 U/L (ref 1–40)
BASOPHILS # BLD AUTO: 0.04 10*3/MM3 (ref 0–0.2)
BASOPHILS NFR BLD AUTO: 0.4 % (ref 0–1.5)
BILIRUB BLD-MCNC: NEGATIVE MG/DL
BILIRUB SERPL-MCNC: 0.6 MG/DL (ref 0–1.2)
BUN SERPL-MCNC: 9 MG/DL (ref 6–20)
BUN/CREAT SERPL: 9.8 (ref 7–25)
CALCIUM SPEC-SCNC: 9.2 MG/DL (ref 8.6–10.5)
CHLORIDE SERPL-SCNC: 97 MMOL/L (ref 98–107)
CHOLEST SERPL-MCNC: 200 MG/DL (ref 0–200)
CLARITY, POC: CLEAR
CO2 SERPL-SCNC: 25.1 MMOL/L (ref 22–29)
COLOR UR: YELLOW
CREAT SERPL-MCNC: 0.92 MG/DL (ref 0.76–1.27)
DEPRECATED RDW RBC AUTO: 39 FL (ref 37–54)
EOSINOPHIL # BLD AUTO: 0.12 10*3/MM3 (ref 0–0.4)
EOSINOPHIL NFR BLD AUTO: 1.2 % (ref 0.3–6.2)
ERYTHROCYTE [DISTWIDTH] IN BLOOD BY AUTOMATED COUNT: 12.9 % (ref 12.3–15.4)
EXPIRATION DATE: NORMAL
GFR SERPL CREATININE-BSD FRML MDRD: 87 ML/MIN/1.73
GLOBULIN UR ELPH-MCNC: 4.4 GM/DL
GLUCOSE SERPL-MCNC: 297 MG/DL (ref 65–99)
GLUCOSE UR STRIP-MCNC: ABNORMAL MG/DL
HBA1C MFR BLD: 10.6 %
HBA1C MFR BLD: 9.72 % (ref 4.8–5.6)
HCT VFR BLD AUTO: 47.1 % (ref 37.5–51)
HDLC SERPL-MCNC: 36 MG/DL (ref 40–60)
HGB BLD-MCNC: 16.1 G/DL (ref 13–17.7)
IMM GRANULOCYTES # BLD AUTO: 0.03 10*3/MM3 (ref 0–0.05)
IMM GRANULOCYTES NFR BLD AUTO: 0.3 % (ref 0–0.5)
KETONES UR QL: NEGATIVE
LDLC SERPL CALC-MCNC: 133 MG/DL (ref 0–100)
LDLC/HDLC SERPL: 3.6 {RATIO}
LEUKOCYTE EST, POC: NEGATIVE
LYMPHOCYTES # BLD AUTO: 2.35 10*3/MM3 (ref 0.7–3.1)
LYMPHOCYTES NFR BLD AUTO: 23.4 % (ref 19.6–45.3)
Lab: NORMAL
MCH RBC QN AUTO: 28.9 PG (ref 26.6–33)
MCHC RBC AUTO-ENTMCNC: 34.2 G/DL (ref 31.5–35.7)
MCV RBC AUTO: 84.4 FL (ref 79–97)
MONOCYTES # BLD AUTO: 0.59 10*3/MM3 (ref 0.1–0.9)
MONOCYTES NFR BLD AUTO: 5.9 % (ref 5–12)
NEUTROPHILS NFR BLD AUTO: 6.9 10*3/MM3 (ref 1.7–7)
NEUTROPHILS NFR BLD AUTO: 68.8 % (ref 42.7–76)
NITRITE UR-MCNC: NEGATIVE MG/ML
NRBC BLD AUTO-RTO: 0 /100 WBC (ref 0–0.2)
PH UR: 5.5 [PH] (ref 5–8)
PLATELET # BLD AUTO: 313 10*3/MM3 (ref 140–450)
PMV BLD AUTO: 9.1 FL (ref 6–12)
POTASSIUM SERPL-SCNC: 4.5 MMOL/L (ref 3.5–5.2)
PROT SERPL-MCNC: 8.5 G/DL (ref 6–8.5)
PROT UR STRIP-MCNC: NEGATIVE MG/DL
PSA SERPL-MCNC: 3.06 NG/ML (ref 0–4)
RBC # BLD AUTO: 5.58 10*6/MM3 (ref 4.14–5.8)
RBC # UR STRIP: NEGATIVE /UL
SODIUM SERPL-SCNC: 134 MMOL/L (ref 136–145)
SP GR UR: 1.02 (ref 1–1.03)
TRIGL SERPL-MCNC: 172 MG/DL (ref 0–150)
TSH SERPL DL<=0.05 MIU/L-ACNC: 0.92 UIU/ML (ref 0.27–4.2)
UROBILINOGEN UR QL: NORMAL
VLDLC SERPL-MCNC: 31 MG/DL (ref 5–40)
WBC # BLD AUTO: 10.03 10*3/MM3 (ref 3.4–10.8)

## 2021-04-06 PROCEDURE — 36415 COLL VENOUS BLD VENIPUNCTURE: CPT

## 2021-04-06 PROCEDURE — 85025 COMPLETE CBC W/AUTO DIFF WBC: CPT | Performed by: PHYSICIAN ASSISTANT

## 2021-04-06 PROCEDURE — 81003 URINALYSIS AUTO W/O SCOPE: CPT | Performed by: PHYSICIAN ASSISTANT

## 2021-04-06 PROCEDURE — 84443 ASSAY THYROID STIM HORMONE: CPT | Performed by: PHYSICIAN ASSISTANT

## 2021-04-06 PROCEDURE — 83036 HEMOGLOBIN GLYCOSYLATED A1C: CPT | Performed by: PHYSICIAN ASSISTANT

## 2021-04-06 PROCEDURE — 80053 COMPREHEN METABOLIC PANEL: CPT | Performed by: PHYSICIAN ASSISTANT

## 2021-04-06 PROCEDURE — 80061 LIPID PANEL: CPT | Performed by: PHYSICIAN ASSISTANT

## 2021-04-06 PROCEDURE — 99204 OFFICE O/P NEW MOD 45 MIN: CPT | Performed by: PHYSICIAN ASSISTANT

## 2021-04-06 PROCEDURE — G0103 PSA SCREENING: HCPCS | Performed by: PHYSICIAN ASSISTANT

## 2021-04-06 RX ORDER — GLIPIZIDE 5 MG/1
5 TABLET, FILM COATED, EXTENDED RELEASE ORAL DAILY
Qty: 30 TABLET | Refills: 2 | Status: SHIPPED | OUTPATIENT
Start: 2021-04-06 | End: 2021-04-08 | Stop reason: SDUPTHER

## 2021-04-06 RX ORDER — SIMVASTATIN 20 MG
20 TABLET ORAL NIGHTLY
Qty: 30 TABLET | Refills: 2 | Status: SHIPPED | OUTPATIENT
Start: 2021-04-06 | End: 2021-04-08 | Stop reason: SDUPTHER

## 2021-04-06 RX ORDER — GLUCOSAMINE HCL/CHONDROITIN SU 500-400 MG
1 CAPSULE ORAL DAILY
Qty: 100 EACH | Refills: 1 | Status: SHIPPED | OUTPATIENT
Start: 2021-04-06 | End: 2021-04-08 | Stop reason: SDUPTHER

## 2021-04-06 RX ORDER — SYRING-NEEDL,DISP,INSUL,0.3 ML 30 GX5/16"
SYRINGE, EMPTY DISPOSABLE MISCELLANEOUS
Qty: 100 EACH | Refills: 1 | Status: SHIPPED | OUTPATIENT
Start: 2021-04-06 | End: 2021-04-08 | Stop reason: SDUPTHER

## 2021-04-06 RX ORDER — BLOOD-GLUCOSE METER
KIT MISCELLANEOUS
Qty: 1 EACH | Refills: 0 | Status: SHIPPED | OUTPATIENT
Start: 2021-04-06 | End: 2021-04-08 | Stop reason: SDUPTHER

## 2021-04-06 NOTE — ASSESSMENT & PLAN NOTE
Diabetes is newly identified.   Dietary recommendations for ADA diet.  Regular aerobic exercise.  Medication changes per orders.  Diabetes will be reassessed in 1 month. Pt declined nutritionist/diabetic educator referral. Check urine microalbumin. Check fasting labs today. Check fpg daily and f/u in 1 mo. Restart glipizide

## 2021-04-06 NOTE — PROGRESS NOTES
Chief Complaint  Establish Care, Knee Pain, Diabetes, and Blood in Urine (recent hospital visit [facility])    Subjective          History of Present Illness  Gagan Oconnell presents to [facility] to establish care.  DMII:  Was dx about 4 years ago. He was started on metformin but it caused GI upset, so he was started on glipizide 5mg and took it for almost a month. Did not feel any different so he stopped. He drinks lot of pop, prefers Coca Cola, will drink 100 oz of Coke per day. He does get a lot of activity, does work outside, halle. Does not eat much, will only have one large meal a day, either lunch or dinner. Feels like most of his diet problems are related to pop. Initially he figured he would manage his diabetes with diet alone but then he did not make diet changes and is here today to restart meds.     Hematuria:  Was seen in the ER a few weeks ago for this, went to [facility].  He woke up pain blood, they did a CT scan and thought it was due to a kidney stone.  He was having some flank pain and they put him on antibiotic as well.  He has not had any blood in his urine for the last week.  Does still get occasional lower back pain.    Knee Pain:  Is not sure which knee causes the problem.  He thinks it is his right knee.  He notices it pops in and out of place sometimes and causes pain.  He assumes he will need to have a knee replacement at some point as he has been hard on his knees with years of halle.  No swelling, redness of the knees.  No other significant joint pains.  It is not causing any pain currently.    Nocturia:  He does wake up at night to pee 4 and 5 times a night.  He is not sure if this is related to diabetes.        Review of Systems   Constitutional: Negative for fever and unexpected weight loss.   Respiratory: Negative for cough, shortness of breath and wheezing.    Cardiovascular: Negative for chest pain and palpitations.   Gastrointestinal:  Negative for abdominal pain, blood in stool, constipation, diarrhea, nausea, vomiting, GERD and indigestion.   Genitourinary: Positive for hematuria and nocturia. Negative for dysuria, flank pain, frequency, scrotal swelling and testicular pain.   Musculoskeletal: Positive for arthralgias.   Neurological: Negative for dizziness, seizures, syncope and headache.   Psychiatric/Behavioral: Positive for sleep disturbance. Negative for depressed mood. The patient is not nervous/anxious.        The following portions of the patient's history were reviewed and updated as appropriate: allergies, current medications, past family history, past medical history, past social history, past surgical history and problem list.    No Known Allergies  Current Outpatient Medications on File Prior to Visit   Medication Sig Dispense Refill   • [DISCONTINUED] amoxicillin-clavulanate (AUGMENTIN) 875-125 MG per tablet Take 1 tablet by mouth 2 (Two) Times a Day. 14 tablet 0   • [DISCONTINUED] glipiZIDE (GLUCOTROL XL) 5 MG ER tablet Take 1 tablet by mouth Daily. 30 tablet 2   • [DISCONTINUED] lisinopril (PRINIVIL,ZESTRIL) 5 MG tablet Take 1 tablet by mouth Daily. 30 tablet 2     No current facility-administered medications on file prior to visit.     Past Medical History:   Diagnosis Date   • Diabetes mellitus (CMS/[facility])    • Kidney stones       History reviewed. No pertinent surgical history.   Family History   Problem Relation Age of Onset   • Diabetes Father    • No Known Problems Son       Social History     Socioeconomic History   • Marital status:      Spouse name: Not on file   • Number of children: Not on file   • Years of education: Not on file   • Highest education level: Not on file   Tobacco Use   • Smoking status: Never Smoker   • Smokeless tobacco: Never Used   Substance and Sexual Activity   • Alcohol use: No   • Drug use: No   • Sexual activity: Defer        Objective   Vital Signs:   Vitals:    04/06/21 0805   BP: 144/82  "  Pulse: 75   Resp: 18   Temp: 98.1 °F (36.7 °C)   SpO2: 96%   Weight: 92.7 kg (204 lb 6.4 oz)   Height: 177.8 cm (70\")      Body mass index is 29.33 kg/m².  Physical Exam  Vitals reviewed.   Constitutional:       General: He is not in acute distress.     Appearance: Normal appearance.   HENT:      Head: Normocephalic and atraumatic.   Eyes:      General: No scleral icterus.     Extraocular Movements: Extraocular movements intact.      Conjunctiva/sclera: Conjunctivae normal.   Cardiovascular:      Rate and Rhythm: Normal rate and regular rhythm.      Heart sounds: Normal heart sounds. No murmur heard.     Pulmonary:      Effort: Pulmonary effort is normal. No respiratory distress.      Breath sounds: Normal breath sounds. No stridor. No wheezing or rhonchi.   Musculoskeletal:      Cervical back: Normal range of motion and neck supple.   Skin:     General: Skin is warm and dry.      Coloration: Skin is not jaundiced.   Neurological:      General: No focal deficit present.      Mental Status: He is alert and oriented to person, place, and time.      Gait: Gait normal.   Psychiatric:         Mood and Affect: Mood normal.         Behavior: Behavior normal.          Result Review :              Results for orders placed or performed in visit on 04/06/21   POC Glycosylated Hemoglobin (Hb A1C)    Specimen: Blood   Result Value Ref Range    Hemoglobin A1C 10.6 %    Lot Number [id number]     Expiration Date 12/15/2022    POC Urinalysis Dipstick, Automated    Specimen: Urine   Result Value Ref Range    Color Yellow Yellow, Straw, Dark Yellow, Katie    Clarity, UA Clear Clear    Specific Gravity  1.025 1.005 - 1.030    pH, Urine 5.5 5.0 - 8.0    Leukocytes Negative Negative    Nitrite, UA Negative Negative    Protein, POC Negative Negative mg/dL    Glucose, UA 3+ (A) Negative, 1000 mg/dL (3+) mg/dL    Ketones, UA Negative Negative    Urobilinogen, UA Normal Normal    Bilirubin Negative Negative    Blood, UA Negative Negative "          Assessment and Plan    Diagnoses and all orders for this visit:    1. Type 2 diabetes mellitus without complication, without long-term current use of insulin (CMS/[facility]) (Primary)  Assessment & Plan:  Diabetes is newly identified.   Dietary recommendations for ADA diet.  Regular aerobic exercise.  Medication changes per orders.  Diabetes will be reassessed in 1 month. Pt declined nutritionist/diabetic educator referral. Check urine microalbumin. Check fasting labs today. Check fpg daily and f/u in 1 mo. Restart glipizide    Orders:  -     POC Glycosylated Hemoglobin (Hb A1C)  -     glucose monitor monitoring kit; Use as directed  Dispense: 1 each; Refill: 0  -     Glucose Blood (Blood Glucose Test) strip; 1 each Daily for 90 days.  Dispense: 100 each; Refill: 1  -     Lancet Device misc; Use with glucometer as directed, once a day  Dispense: 100 each; Refill: 1  -     glipizide (GLUCOTROL XL) 5 MG ER tablet; Take 1 tablet by mouth Daily.  Dispense: 30 tablet; Refill: 2  -     Hemoglobin A1c; Future  -     Comprehensive Metabolic Panel; Future  -     CBC Auto Differential; Future  -     MicroAlbumin, Urine, Random - Urine, Clean Catch; Future    2. Hematuria, unspecified type  Assessment & Plan:  Resolved, no blood in urine on dipstick, f/u if happens again    Orders:  -     POC Urinalysis Dipstick, Automated    3. Chronic pain of right knee  Assessment & Plan:  Pt will take it easy on his knee and consider xray at f/u. Declined further work up today      4. Elevated BP without diagnosis of hypertension  Assessment & Plan:  Will monitor, check at follow-up      5. Nocturia  Assessment & Plan:  Check psa, likely related to uncontrolled dm.     Orders:  -     POC Urinalysis Dipstick, Automated    6. Screening, lipid  -     Lipid Panel; Future    7. Screening for endocrine disorder  -     TSH Rfx On Abnormal To Free T4; Future    8. Screening for prostate cancer  -     PSA SCREENING; Future    9. Dietary  counseling  Pt is going to work on cutting back on his pop, that seems to be his biggest césar for good DM control. He declines referral to nutritionist at this time.        Follow Up   Return in about 4 weeks (around 5/4/2021).    Follow up if symptoms worsen or persist or has new or concerning symptoms, go to ER for severe symptoms.   Reviewed common medication effects and side effects and to report side effects immediately, the patient expressed good understanding.  Encouraged medication compliance and the importance of keeping scheduled follow up appointments with me and any other providers  If labs or images are ordered we will contact you with the results within the next week.  If you have not heard from us after a week please call our office to inquire about the results.   Patient was given instructions and counseling regarding his condition or for health maintenance advice. Please see specific information pulled into the AVS if appropriate.     Ivet Phillip PA-C    * Please note that portions of this note may have been completed with a voice recognition program. Efforts were made to edit the dictation but occasionally words are erroneously transcribed.

## 2021-04-07 ENCOUNTER — TELEPHONE (OUTPATIENT)
Dept: INTERNAL MEDICINE | Facility: CLINIC | Age: 51
End: 2021-04-07

## 2021-04-07 NOTE — TELEPHONE ENCOUNTER
----- Message from Ivet Phillip PA-C sent at 4/6/2021 10:00 PM EDT -----  Labs looked ok (glucose was about 300 but that was expected), cholesterol elevated. I would suggest adding a statin for cholesterol for prevention of heart attack and stroke. I will send one to the pharmacy. Take it at night.

## 2021-04-07 NOTE — PROGRESS NOTES
Labs looked ok (glucose was about 300 but that was expected), cholesterol elevated. I would suggest adding a statin for cholesterol for prevention of heart attack and stroke. I will send one to the pharmacy. Take it at night.

## 2021-04-08 ENCOUNTER — TELEPHONE (OUTPATIENT)
Dept: INTERNAL MEDICINE | Facility: CLINIC | Age: 51
End: 2021-04-08

## 2021-04-08 DIAGNOSIS — E11.9 TYPE 2 DIABETES MELLITUS WITHOUT COMPLICATION, WITHOUT LONG-TERM CURRENT USE OF INSULIN (HCC): ICD-10-CM

## 2021-04-08 RX ORDER — SYRING-NEEDL,DISP,INSUL,0.3 ML 30 GX5/16"
SYRINGE, EMPTY DISPOSABLE MISCELLANEOUS
Qty: 100 EACH | Refills: 1 | Status: SHIPPED | OUTPATIENT
Start: 2021-04-08

## 2021-04-08 RX ORDER — BLOOD-GLUCOSE METER
KIT MISCELLANEOUS
Qty: 1 EACH | Refills: 0 | Status: SHIPPED | OUTPATIENT
Start: 2021-04-08

## 2021-04-08 RX ORDER — SIMVASTATIN 20 MG
20 TABLET ORAL NIGHTLY
Qty: 30 TABLET | Refills: 2 | Status: SHIPPED | OUTPATIENT
Start: 2021-04-08

## 2021-04-08 RX ORDER — GLUCOSAMINE HCL/CHONDROITIN SU 500-400 MG
1 CAPSULE ORAL DAILY
Qty: 100 EACH | Refills: 1 | Status: SHIPPED | OUTPATIENT
Start: 2021-04-08 | End: 2021-07-07

## 2021-04-08 RX ORDER — GLIPIZIDE 5 MG/1
5 TABLET, FILM COATED, EXTENDED RELEASE ORAL DAILY
Qty: 30 TABLET | Refills: 2 | Status: SHIPPED | OUTPATIENT
Start: 2021-04-08

## 2021-04-08 NOTE — TELEPHONE ENCOUNTER
Caller: Gagan Oconnell    Relationship: Self    Best call back number:[phone]  Medication needed:   Requested Prescriptions     Pending Prescriptions Disp Refills   • glipizide (GLUCOTROL XL) 5 MG ER tablet 30 tablet 2     Sig: Take 1 tablet by mouth Daily.   • Glucose Blood (Blood Glucose Test) strip 100 each 1     Si each Daily for 90 days.   • glucose monitor monitoring kit 1 each 0     Sig: Use as directed   • Lancet Device misc 100 each 1     Sig: Use with glucometer as directed, once a day   • simvastatin (Zocor) 20 MG tablet 30 tablet 2     Sig: Take 1 tablet by mouth Every Night.       When do you need the refill by: AS SOON AS POSSIBLE    What additional details did the patient provide when requesting the medication: PATIENT STATED THE [facility]  PATIENT REQUESTING PRESCRIPTION ORDER TO BE [facility]    Does the patient have less than a 3 day supply:  [x] Yes  [] No    What is the patient's preferred pharmacy: [id number] - [place], [place] - [address] [place] [address] AT [facility] [phone] [facility] [phone] FX

## 2022-09-04 ENCOUNTER — HOSPITAL ENCOUNTER (EMERGENCY)
Facility: HOSPITAL | Age: 52
Discharge: HOME | End: 2022-09-04
Attending: EMERGENCY MEDICINE | Admitting: EMERGENCY MEDICINE

## 2022-09-04 ENCOUNTER — APPOINTMENT (OUTPATIENT)
Dept: GENERAL RADIOLOGY | Facility: HOSPITAL | Age: 52
End: 2022-09-04

## 2022-09-04 VITALS
HEART RATE: 119 BPM | WEIGHT: 200 LBS | SYSTOLIC BLOOD PRESSURE: 136 MMHG | OXYGEN SATURATION: 98 % | DIASTOLIC BLOOD PRESSURE: 95 MMHG | HEIGHT: 70 IN | BODY MASS INDEX: 28.63 KG/M2 | TEMPERATURE: 100.5 F | RESPIRATION RATE: 18 BRPM

## 2022-09-04 DIAGNOSIS — J18.9 PNEUMONIA OF LEFT LOWER LOBE DUE TO INFECTIOUS ORGANISM: Primary | ICD-10-CM

## 2022-09-04 LAB
ALBUMIN SERPL-MCNC: 3.2 G/DL (ref 3.5–5.2)
ALBUMIN/GLOB SERPL: 0.8 G/DL
ALP SERPL-CCNC: 115 U/L (ref 39–117)
ALT SERPL W P-5'-P-CCNC: 21 U/L (ref 1–41)
ANION GAP SERPL CALCULATED.3IONS-SCNC: 12.1 MMOL/L (ref 5–15)
AST SERPL-CCNC: 20 U/L (ref 1–40)
BASOPHILS # BLD AUTO: 0.03 10*3/MM3 (ref 0–0.2)
BASOPHILS NFR BLD AUTO: 0.2 % (ref 0–1.5)
BILIRUB SERPL-MCNC: 0.8 MG/DL (ref 0–1.2)
BUN SERPL-MCNC: 11 MG/DL (ref 6–20)
BUN/CREAT SERPL: 12.9 (ref 7–25)
CALCIUM SPEC-SCNC: 8.6 MG/DL (ref 8.6–10.5)
CHLORIDE SERPL-SCNC: 93 MMOL/L (ref 98–107)
CO2 SERPL-SCNC: 21.9 MMOL/L (ref 22–29)
CREAT SERPL-MCNC: 0.85 MG/DL (ref 0.76–1.27)
D-LACTATE SERPL-SCNC: 2 MMOL/L (ref 0.5–2)
DEPRECATED RDW RBC AUTO: 36.3 FL (ref 37–54)
EGFRCR SERPLBLD CKD-EPI 2021: 105.2 ML/MIN/1.73
EOSINOPHIL # BLD AUTO: 0.03 10*3/MM3 (ref 0–0.4)
EOSINOPHIL NFR BLD AUTO: 0.2 % (ref 0.3–6.2)
ERYTHROCYTE [DISTWIDTH] IN BLOOD BY AUTOMATED COUNT: 12.6 % (ref 12.3–15.4)
FLUAV RNA RESP QL NAA+PROBE: NOT DETECTED
FLUBV RNA RESP QL NAA+PROBE: NOT DETECTED
GLOBULIN UR ELPH-MCNC: 4 GM/DL
GLUCOSE SERPL-MCNC: 392 MG/DL (ref 65–99)
HCT VFR BLD AUTO: 37.5 % (ref 37.5–51)
HGB BLD-MCNC: 13.8 G/DL (ref 13–17.7)
IMM GRANULOCYTES # BLD AUTO: 0.05 10*3/MM3 (ref 0–0.05)
IMM GRANULOCYTES NFR BLD AUTO: 0.4 % (ref 0–0.5)
LYMPHOCYTES # BLD AUTO: 0.87 10*3/MM3 (ref 0.7–3.1)
LYMPHOCYTES NFR BLD AUTO: 6.9 % (ref 19.6–45.3)
MCH RBC QN AUTO: 29.6 PG (ref 26.6–33)
MCHC RBC AUTO-ENTMCNC: 36.8 G/DL (ref 31.5–35.7)
MCV RBC AUTO: 80.5 FL (ref 79–97)
MONOCYTES # BLD AUTO: 0.67 10*3/MM3 (ref 0.1–0.9)
MONOCYTES NFR BLD AUTO: 5.3 % (ref 5–12)
NEUTROPHILS NFR BLD AUTO: 11.04 10*3/MM3 (ref 1.7–7)
NEUTROPHILS NFR BLD AUTO: 87 % (ref 42.7–76)
NRBC BLD AUTO-RTO: 0 /100 WBC (ref 0–0.2)
PLATELET # BLD AUTO: 222 10*3/MM3 (ref 140–450)
PMV BLD AUTO: 9 FL (ref 6–12)
POTASSIUM SERPL-SCNC: 3.7 MMOL/L (ref 3.5–5.2)
PROCALCITONIN SERPL-MCNC: 0.67 NG/ML (ref 0–0.25)
PROT SERPL-MCNC: 7.2 G/DL (ref 6–8.5)
RBC # BLD AUTO: 4.66 10*6/MM3 (ref 4.14–5.8)
SARS-COV-2 RNA RESP QL NAA+PROBE: NOT DETECTED
SODIUM SERPL-SCNC: 127 MMOL/L (ref 136–145)
WBC NRBC COR # BLD: 12.69 10*3/MM3 (ref 3.4–10.8)

## 2022-09-04 PROCEDURE — 87636 SARSCOV2 & INF A&B AMP PRB: CPT | Performed by: EMERGENCY MEDICINE

## 2022-09-04 PROCEDURE — 71045 X-RAY EXAM CHEST 1 VIEW: CPT

## 2022-09-04 PROCEDURE — 25010000002 CEFTRIAXONE SODIUM-DEXTROSE 1-3.74 GM-%(50ML) RECONSTITUTED SOLUTION: Performed by: PHYSICIAN ASSISTANT

## 2022-09-04 PROCEDURE — 80053 COMPREHEN METABOLIC PANEL: CPT | Performed by: PHYSICIAN ASSISTANT

## 2022-09-04 PROCEDURE — 83605 ASSAY OF LACTIC ACID: CPT | Performed by: PHYSICIAN ASSISTANT

## 2022-09-04 PROCEDURE — 85025 COMPLETE CBC W/AUTO DIFF WBC: CPT | Performed by: PHYSICIAN ASSISTANT

## 2022-09-04 PROCEDURE — 96365 THER/PROPH/DIAG IV INF INIT: CPT

## 2022-09-04 PROCEDURE — 99283 EMERGENCY DEPT VISIT LOW MDM: CPT

## 2022-09-04 PROCEDURE — 84145 PROCALCITONIN (PCT): CPT | Performed by: PHYSICIAN ASSISTANT

## 2022-09-04 RX ORDER — BENZONATATE 200 MG/1
200 CAPSULE ORAL 3 TIMES DAILY PRN
Qty: 30 CAPSULE | Refills: 0 | Status: SHIPPED | OUTPATIENT
Start: 2022-09-04

## 2022-09-04 RX ORDER — IBUPROFEN 800 MG/1
800 TABLET ORAL ONCE
Status: COMPLETED | OUTPATIENT
Start: 2022-09-04 | End: 2022-09-04

## 2022-09-04 RX ORDER — CEFUROXIME AXETIL 500 MG/1
500 TABLET ORAL 2 TIMES DAILY
Qty: 10 TABLET | Refills: 0 | Status: SHIPPED | OUTPATIENT
Start: 2022-09-04 | End: 2022-09-09

## 2022-09-04 RX ORDER — DOXYCYCLINE 100 MG/1
100 CAPSULE ORAL 2 TIMES DAILY
Qty: 10 CAPSULE | Refills: 0 | Status: SHIPPED | OUTPATIENT
Start: 2022-09-04 | End: 2022-09-09

## 2022-09-04 RX ORDER — DOXYCYCLINE 100 MG/1
100 CAPSULE ORAL ONCE
Status: COMPLETED | OUTPATIENT
Start: 2022-09-04 | End: 2022-09-04

## 2022-09-04 RX ORDER — CEFTRIAXONE 1 G/50ML
1 INJECTION, SOLUTION INTRAVENOUS ONCE
Status: COMPLETED | OUTPATIENT
Start: 2022-09-04 | End: 2022-09-04

## 2022-09-04 RX ORDER — BENZONATATE 100 MG/1
200 CAPSULE ORAL ONCE
Status: COMPLETED | OUTPATIENT
Start: 2022-09-04 | End: 2022-09-04

## 2022-09-04 RX ADMIN — SODIUM CHLORIDE 1000 ML: 9 INJECTION, SOLUTION INTRAVENOUS at 19:58

## 2022-09-04 RX ADMIN — DOXYCYCLINE 100 MG: 100 CAPSULE ORAL at 21:00

## 2022-09-04 RX ADMIN — CEFTRIAXONE 1 G: 1 INJECTION, SOLUTION INTRAVENOUS at 20:34

## 2022-09-04 RX ADMIN — BENZONATATE 200 MG: 100 CAPSULE ORAL at 21:00

## 2022-09-04 RX ADMIN — IBUPROFEN 800 MG: 800 TABLET, FILM COATED ORAL at 19:51

## 2022-09-04 NOTE — ED PROVIDER NOTES
"Subjective   Chief Complaint: Cough, fatigue, fever  History of Present Illness: 52-year-old male comes in for evaluation of above complaints.  He states he had a fever as high as \"106\" degrees oral at home that comes down with DayQuil and cool showers.  States symptoms started 3 days ago.  Just got back from vacation in [place] but felt ill before going.  No abdominal pain nausea vomiting or diarrhea.  Last dose of DayQuil was a couple hours prior to arrival.  Onset: 3 days ago  Timing: Ongoing  Exacerbating / Alleviating factors: None  Associated symptoms: None      Nurses Notes reviewed and agree, including vitals, allergies, social history and prior medical history.          Review of Systems   Constitutional: Positive for fatigue and fever.   HENT: Negative.    Eyes: Negative.    Respiratory: Positive for cough.    Cardiovascular: Negative.    Gastrointestinal: Negative.    Genitourinary: Negative.    Musculoskeletal: Negative.    Skin: Negative.    Allergic/Immunologic: Negative.    Neurological: Negative.    Psychiatric/Behavioral: Negative.    All other systems reviewed and are negative.      Past Medical History:   Diagnosis Date   • Diabetes mellitus (HCC)    • Kidney stones        No Known Allergies    History reviewed. No pertinent surgical history.    Family History   Problem Relation Age of Onset   • Diabetes Father    • No Known Problems Son        Social History     Socioeconomic History   • Marital status:    Tobacco Use   • Smoking status: Never Smoker   • Smokeless tobacco: Never Used   Substance and Sexual Activity   • Alcohol use: No   • Drug use: No   • Sexual activity: Defer           Objective   Physical Exam  Vitals and nursing note reviewed.   Constitutional:       General: He is not in acute distress.     Appearance: Normal appearance. He is not ill-appearing, toxic-appearing or diaphoretic.   HENT:      Head: Normocephalic and atraumatic.      Nose: Nose normal.   Eyes:      " Extraocular Movements: Extraocular movements intact.   Cardiovascular:      Rate and Rhythm: Normal rate and regular rhythm.   Pulmonary:      Effort: Pulmonary effort is normal.      Breath sounds: Normal breath sounds.   Abdominal:      General: Abdomen is flat.      Palpations: Abdomen is soft.   Musculoskeletal:         General: Normal range of motion.      Cervical back: Normal range of motion. No rigidity.   Skin:     General: Skin is warm and dry.   Neurological:      General: No focal deficit present.      Mental Status: He is alert.   Psychiatric:         Mood and Affect: Mood normal.         Behavior: Behavior normal.         Procedures           ED Course  ED Course as of 09/04/22 2051   Sun Sep 04, 2022   1935 COVID-19 and FLU A/B PCR - Swab, Nasopharynx [TM]      ED Course User Index  [TM] Richar Reyes PA-C                                           [facility]  Patient no acute distress at this time 2050 hrs.  Feeling better.  Mildly elevated Pro-Zane and white blood cell count however patient wishing for discharge home.  I feel this is reasonable.  He has been given fluids and antibiotics.  Will discharge home on antibiotics and Tessalon Perles to alternate Motrin Tylenol for fever.  Final diagnoses:   Pneumonia of left lower lobe due to infectious organism       ED Disposition  ED Disposition     ED Disposition   Discharge    Condition   Stable    Comment   --             Ivet Phillip PA-C  [address] [address]  [phone]      As needed    [facility] Emergency Department  [address] [place] [id number] [phone]    If symptoms worsen         Medication List      New Prescriptions    benzonatate 200 MG capsule  Commonly known as: TESSALON  Take 1 capsule by mouth 3 (Three) Times a Day As Needed for Cough.     cefuroxime 500 MG tablet  Commonly known as: CEFTIN  Take 1 tablet by mouth 2 (Two) Times a Day for 5 days.      doxycycline 100 MG capsule  Commonly known as: MONODOX  Take 1 capsule by mouth 2 (Two) Times a Day for 5 days.           Where to Get Your Medications      These medications were sent to [id number] - [place], [place] - [address] AT [facility] [address] - [phone] PH - [phone] FX  [address], [place] [address]    Phone: [phone]   · benzonatate 200 MG capsule  · cefuroxime 500 MG tablet  · doxycycline 100 MG capsule          Richar Reyes PA-C  09/04/22 8134

## 2023-01-29 ENCOUNTER — APPOINTMENT (OUTPATIENT)
Dept: GENERAL RADIOLOGY | Facility: HOSPITAL | Age: 53
End: 2023-01-29
Payer: COMMERCIAL

## 2023-01-29 ENCOUNTER — HOSPITAL ENCOUNTER (EMERGENCY)
Facility: HOSPITAL | Age: 53
Discharge: HOME | End: 2023-01-29
Attending: EMERGENCY MEDICINE | Admitting: EMERGENCY MEDICINE
Payer: COMMERCIAL

## 2023-01-29 VITALS
HEART RATE: 81 BPM | TEMPERATURE: 98.7 F | HEIGHT: 70 IN | OXYGEN SATURATION: 99 % | SYSTOLIC BLOOD PRESSURE: 137 MMHG | WEIGHT: 205 LBS | RESPIRATION RATE: 78 BRPM | DIASTOLIC BLOOD PRESSURE: 90 MMHG | BODY MASS INDEX: 29.35 KG/M2

## 2023-01-29 DIAGNOSIS — M25.562 ACUTE PAIN OF LEFT KNEE: Primary | ICD-10-CM

## 2023-01-29 PROCEDURE — 99283 EMERGENCY DEPT VISIT LOW MDM: CPT

## 2023-01-29 PROCEDURE — 73562 X-RAY EXAM OF KNEE 3: CPT

## 2023-01-29 RX ORDER — HYDROCODONE BITARTRATE AND ACETAMINOPHEN 5; 325 MG/1; MG/1
1 TABLET ORAL EVERY 6 HOURS PRN
Qty: 10 TABLET | Refills: 0 | Status: SHIPPED | OUTPATIENT
Start: 2023-01-29

## 2023-01-29 RX ORDER — HYDROCODONE BITARTRATE AND ACETAMINOPHEN 5; 325 MG/1; MG/1
2 TABLET ORAL ONCE
Status: COMPLETED | OUTPATIENT
Start: 2023-01-29 | End: 2023-01-29

## 2023-01-29 RX ADMIN — HYDROCODONE BITARTRATE AND ACETAMINOPHEN 2 TABLET: 5; 325 TABLET ORAL at 02:35

## 2023-02-03 ENCOUNTER — TRANSCRIBE ORDERS (OUTPATIENT)
Dept: ADMINISTRATIVE | Facility: HOSPITAL | Age: 53
End: 2023-02-03
Payer: COMMERCIAL

## 2023-02-03 DIAGNOSIS — S76.212A STRAIN OF ADDUCTOR MAGNUS MUSCLE, LEFT, INITIAL ENCOUNTER: Primary | ICD-10-CM

## 2023-02-03 DIAGNOSIS — S76.812A STRAIN OF OTHER SPECIFIED MUSCLES, FASCIA AND TENDONS AT THIGH LEVEL, LEFT THIGH, INITIAL ENCOUNTER: ICD-10-CM

## 2024-09-16 ENCOUNTER — APPOINTMENT (OUTPATIENT)
Dept: GENERAL RADIOLOGY | Facility: HOSPITAL | Age: 54
End: 2024-09-16
Payer: COMMERCIAL

## 2024-09-16 ENCOUNTER — HOSPITAL ENCOUNTER (EMERGENCY)
Facility: HOSPITAL | Age: 54
Discharge: HOME | End: 2024-09-16
Attending: STUDENT IN AN ORGANIZED HEALTH CARE EDUCATION/TRAINING PROGRAM | Admitting: STUDENT IN AN ORGANIZED HEALTH CARE EDUCATION/TRAINING PROGRAM
Payer: COMMERCIAL

## 2024-09-16 VITALS
WEIGHT: 191.8 LBS | SYSTOLIC BLOOD PRESSURE: 154 MMHG | BODY MASS INDEX: 27.46 KG/M2 | RESPIRATION RATE: 16 BRPM | DIASTOLIC BLOOD PRESSURE: 109 MMHG | OXYGEN SATURATION: 97 % | HEIGHT: 70 IN | TEMPERATURE: 98.6 F | HEART RATE: 62 BPM

## 2024-09-16 DIAGNOSIS — L97.521 DIABETIC ULCER OF TOE OF LEFT FOOT ASSOCIATED WITH TYPE 2 DIABETES MELLITUS, LIMITED TO BREAKDOWN OF SKIN: Primary | ICD-10-CM

## 2024-09-16 DIAGNOSIS — R73.9 BLOOD GLUCOSE ELEVATED: ICD-10-CM

## 2024-09-16 DIAGNOSIS — E11.621 DIABETIC ULCER OF TOE OF LEFT FOOT ASSOCIATED WITH TYPE 2 DIABETES MELLITUS, LIMITED TO BREAKDOWN OF SKIN: Primary | ICD-10-CM

## 2024-09-16 LAB
ALBUMIN SERPL-MCNC: 3.7 G/DL (ref 3.5–5.2)
ALBUMIN/GLOB SERPL: 1.1 G/DL
ALP SERPL-CCNC: 120 U/L (ref 39–117)
ALT SERPL W P-5'-P-CCNC: 20 U/L (ref 1–41)
ANION GAP SERPL CALCULATED.3IONS-SCNC: 10.8 MMOL/L (ref 5–15)
AST SERPL-CCNC: 17 U/L (ref 1–40)
BASOPHILS # BLD AUTO: 0.03 10*3/MM3 (ref 0–0.2)
BASOPHILS NFR BLD AUTO: 0.4 % (ref 0–1.5)
BILIRUB SERPL-MCNC: 0.4 MG/DL (ref 0–1.2)
BUN SERPL-MCNC: 9 MG/DL (ref 6–20)
BUN/CREAT SERPL: 11.3 (ref 7–25)
CALCIUM SPEC-SCNC: 8.8 MG/DL (ref 8.6–10.5)
CHLORIDE SERPL-SCNC: 101 MMOL/L (ref 98–107)
CO2 SERPL-SCNC: 23.2 MMOL/L (ref 22–29)
CREAT SERPL-MCNC: 0.8 MG/DL (ref 0.76–1.27)
CRP SERPL-MCNC: <0.3 MG/DL (ref 0–0.5)
DEPRECATED RDW RBC AUTO: 37 FL (ref 37–54)
EGFRCR SERPLBLD CKD-EPI 2021: 105.8 ML/MIN/1.73
EOSINOPHIL # BLD AUTO: 0.18 10*3/MM3 (ref 0–0.4)
EOSINOPHIL NFR BLD AUTO: 2.2 % (ref 0.3–6.2)
ERYTHROCYTE [DISTWIDTH] IN BLOOD BY AUTOMATED COUNT: 12.4 % (ref 12.3–15.4)
GLOBULIN UR ELPH-MCNC: 3.4 GM/DL
GLUCOSE SERPL-MCNC: 338 MG/DL (ref 65–99)
HCT VFR BLD AUTO: 41 % (ref 37.5–51)
HGB BLD-MCNC: 14.7 G/DL (ref 13–17.7)
IMM GRANULOCYTES # BLD AUTO: 0.03 10*3/MM3 (ref 0–0.05)
IMM GRANULOCYTES NFR BLD AUTO: 0.4 % (ref 0–0.5)
LYMPHOCYTES # BLD AUTO: 2.73 10*3/MM3 (ref 0.7–3.1)
LYMPHOCYTES NFR BLD AUTO: 34.1 % (ref 19.6–45.3)
MCH RBC QN AUTO: 29.3 PG (ref 26.6–33)
MCHC RBC AUTO-ENTMCNC: 35.9 G/DL (ref 31.5–35.7)
MCV RBC AUTO: 81.8 FL (ref 79–97)
MONOCYTES # BLD AUTO: 0.48 10*3/MM3 (ref 0.1–0.9)
MONOCYTES NFR BLD AUTO: 6 % (ref 5–12)
NEUTROPHILS NFR BLD AUTO: 4.56 10*3/MM3 (ref 1.7–7)
NEUTROPHILS NFR BLD AUTO: 56.9 % (ref 42.7–76)
NRBC BLD AUTO-RTO: 0 /100 WBC (ref 0–0.2)
PLATELET # BLD AUTO: 226 10*3/MM3 (ref 140–450)
PMV BLD AUTO: 8.6 FL (ref 6–12)
POTASSIUM SERPL-SCNC: 3.7 MMOL/L (ref 3.5–5.2)
PROT SERPL-MCNC: 7.1 G/DL (ref 6–8.5)
RBC # BLD AUTO: 5.01 10*6/MM3 (ref 4.14–5.8)
SODIUM SERPL-SCNC: 135 MMOL/L (ref 136–145)
WBC NRBC COR # BLD AUTO: 8.01 10*3/MM3 (ref 3.4–10.8)

## 2024-09-16 PROCEDURE — 99283 EMERGENCY DEPT VISIT LOW MDM: CPT

## 2024-09-16 PROCEDURE — 86140 C-REACTIVE PROTEIN: CPT | Performed by: PHYSICIAN ASSISTANT

## 2024-09-16 PROCEDURE — 25010000002 KETOROLAC TROMETHAMINE PER 15 MG: Performed by: STUDENT IN AN ORGANIZED HEALTH CARE EDUCATION/TRAINING PROGRAM

## 2024-09-16 PROCEDURE — 96374 THER/PROPH/DIAG INJ IV PUSH: CPT

## 2024-09-16 PROCEDURE — 80053 COMPREHEN METABOLIC PANEL: CPT | Performed by: PHYSICIAN ASSISTANT

## 2024-09-16 PROCEDURE — 73630 X-RAY EXAM OF FOOT: CPT

## 2024-09-16 PROCEDURE — 85025 COMPLETE CBC W/AUTO DIFF WBC: CPT | Performed by: PHYSICIAN ASSISTANT

## 2024-09-16 RX ORDER — DOXYCYCLINE 100 MG/1
100 CAPSULE ORAL 2 TIMES DAILY
Qty: 14 CAPSULE | Refills: 0 | Status: SHIPPED | OUTPATIENT
Start: 2024-09-16 | End: 2024-09-23

## 2024-09-16 RX ORDER — SODIUM CHLORIDE 0.9 % (FLUSH) 0.9 %
10 SYRINGE (ML) INJECTION AS NEEDED
Status: DISCONTINUED | OUTPATIENT
Start: 2024-09-16 | End: 2024-09-16 | Stop reason: HOSPADM

## 2024-09-16 RX ORDER — DOXYCYCLINE 100 MG/1
100 CAPSULE ORAL ONCE
Status: COMPLETED | OUTPATIENT
Start: 2024-09-16 | End: 2024-09-16

## 2024-09-16 RX ORDER — KETOROLAC TROMETHAMINE 30 MG/ML
30 INJECTION, SOLUTION INTRAMUSCULAR; INTRAVENOUS ONCE
Status: COMPLETED | OUTPATIENT
Start: 2024-09-16 | End: 2024-09-16

## 2024-09-16 RX ORDER — CEPHALEXIN 500 MG/1
500 CAPSULE ORAL 3 TIMES DAILY
Qty: 21 CAPSULE | Refills: 0 | Status: SHIPPED | OUTPATIENT
Start: 2024-09-16 | End: 2024-09-23

## 2024-09-16 RX ADMIN — CEPHALEXIN 500 MG: 250 CAPSULE ORAL at 02:48

## 2024-09-16 RX ADMIN — DOXYCYCLINE 100 MG: 100 CAPSULE ORAL at 02:48

## 2024-09-16 RX ADMIN — KETOROLAC TROMETHAMINE 30 MG: 30 INJECTION, SOLUTION INTRAMUSCULAR; INTRAVENOUS at 01:56

## 2024-11-09 ENCOUNTER — HOSPITAL ENCOUNTER (EMERGENCY)
Facility: HOSPITAL | Age: 54
Discharge: HOME | End: 2024-11-10
Attending: STUDENT IN AN ORGANIZED HEALTH CARE EDUCATION/TRAINING PROGRAM
Payer: COMMERCIAL

## 2024-11-09 ENCOUNTER — APPOINTMENT (OUTPATIENT)
Dept: GENERAL RADIOLOGY | Facility: HOSPITAL | Age: 54
End: 2024-11-09
Payer: COMMERCIAL

## 2024-11-09 VITALS
WEIGHT: 190 LBS | HEART RATE: 83 BPM | SYSTOLIC BLOOD PRESSURE: 129 MMHG | HEIGHT: 70 IN | RESPIRATION RATE: 18 BRPM | BODY MASS INDEX: 27.2 KG/M2 | DIASTOLIC BLOOD PRESSURE: 94 MMHG | TEMPERATURE: 97.9 F | OXYGEN SATURATION: 98 %

## 2024-11-09 DIAGNOSIS — M79.604 RIGHT LEG PAIN: Primary | ICD-10-CM

## 2024-11-09 PROCEDURE — 73552 X-RAY EXAM OF FEMUR 2/>: CPT

## 2024-11-09 PROCEDURE — 96372 THER/PROPH/DIAG INJ SC/IM: CPT

## 2024-11-09 PROCEDURE — 99283 EMERGENCY DEPT VISIT LOW MDM: CPT | Performed by: STUDENT IN AN ORGANIZED HEALTH CARE EDUCATION/TRAINING PROGRAM

## 2024-11-09 PROCEDURE — 36415 COLL VENOUS BLD VENIPUNCTURE: CPT

## 2024-11-09 RX ORDER — KETOROLAC TROMETHAMINE 30 MG/ML
30 INJECTION, SOLUTION INTRAMUSCULAR; INTRAVENOUS ONCE
Status: COMPLETED | OUTPATIENT
Start: 2024-11-09 | End: 2024-11-10

## 2024-11-09 NOTE — Clinical Note
[facility] EMERGENCY DEPARTMENT  [address] [facility] [address]  Phone: [phone]    Gagan Oconnell was seen and treated in our emergency department on 11/9/2024.  He may return to work on 11/12/2024.         Thank you for choosing [facility].    Bashir Savage PA-C

## 2024-11-10 LAB
ALBUMIN SERPL-MCNC: 3.7 G/DL (ref 3.5–5.2)
ALBUMIN/GLOB SERPL: 1 G/DL
ALP SERPL-CCNC: 117 U/L (ref 39–117)
ALT SERPL W P-5'-P-CCNC: 17 U/L (ref 1–41)
ANION GAP SERPL CALCULATED.3IONS-SCNC: 15.1 MMOL/L (ref 5–15)
AST SERPL-CCNC: 17 U/L (ref 1–40)
BASOPHILS # BLD AUTO: 0.04 10*3/MM3 (ref 0–0.2)
BASOPHILS NFR BLD AUTO: 0.5 % (ref 0–1.5)
BILIRUB SERPL-MCNC: 0.4 MG/DL (ref 0–1.2)
BUN SERPL-MCNC: 10 MG/DL (ref 6–20)
BUN/CREAT SERPL: 11.8 (ref 7–25)
CALCIUM SPEC-SCNC: 8.5 MG/DL (ref 8.6–10.5)
CHLORIDE SERPL-SCNC: 98 MMOL/L (ref 98–107)
CK SERPL-CCNC: 96 U/L (ref 20–200)
CO2 SERPL-SCNC: 18.9 MMOL/L (ref 22–29)
CREAT SERPL-MCNC: 0.85 MG/DL (ref 0.76–1.27)
DEPRECATED RDW RBC AUTO: 35.8 FL (ref 37–54)
EGFRCR SERPLBLD CKD-EPI 2021: 103.9 ML/MIN/1.73
EOSINOPHIL # BLD AUTO: 0.14 10*3/MM3 (ref 0–0.4)
EOSINOPHIL NFR BLD AUTO: 1.8 % (ref 0.3–6.2)
ERYTHROCYTE [DISTWIDTH] IN BLOOD BY AUTOMATED COUNT: 12.2 % (ref 12.3–15.4)
GLOBULIN UR ELPH-MCNC: 3.8 GM/DL
GLUCOSE SERPL-MCNC: 365 MG/DL (ref 65–99)
HCT VFR BLD AUTO: 45.3 % (ref 37.5–51)
HGB BLD-MCNC: 16.1 G/DL (ref 13–17.7)
IMM GRANULOCYTES # BLD AUTO: 0.01 10*3/MM3 (ref 0–0.05)
IMM GRANULOCYTES NFR BLD AUTO: 0.1 % (ref 0–0.5)
LYMPHOCYTES # BLD AUTO: 2.93 10*3/MM3 (ref 0.7–3.1)
LYMPHOCYTES NFR BLD AUTO: 37.9 % (ref 19.6–45.3)
MCH RBC QN AUTO: 28.9 PG (ref 26.6–33)
MCHC RBC AUTO-ENTMCNC: 35.5 G/DL (ref 31.5–35.7)
MCV RBC AUTO: 81.2 FL (ref 79–97)
MONOCYTES # BLD AUTO: 0.57 10*3/MM3 (ref 0.1–0.9)
MONOCYTES NFR BLD AUTO: 7.4 % (ref 5–12)
MYOGLOBIN SERPL-MCNC: <21 NG/ML (ref 28–72)
NEUTROPHILS NFR BLD AUTO: 4.04 10*3/MM3 (ref 1.7–7)
NEUTROPHILS NFR BLD AUTO: 52.3 % (ref 42.7–76)
NRBC BLD AUTO-RTO: 0 /100 WBC (ref 0–0.2)
PLATELET # BLD AUTO: 237 10*3/MM3 (ref 140–450)
PMV BLD AUTO: 8.6 FL (ref 6–12)
POTASSIUM SERPL-SCNC: 3.9 MMOL/L (ref 3.5–5.2)
PROT SERPL-MCNC: 7.5 G/DL (ref 6–8.5)
RBC # BLD AUTO: 5.58 10*6/MM3 (ref 4.14–5.8)
SODIUM SERPL-SCNC: 132 MMOL/L (ref 136–145)
WBC NRBC COR # BLD AUTO: 7.73 10*3/MM3 (ref 3.4–10.8)

## 2024-11-10 PROCEDURE — 85025 COMPLETE CBC W/AUTO DIFF WBC: CPT

## 2024-11-10 PROCEDURE — 80053 COMPREHEN METABOLIC PANEL: CPT

## 2024-11-10 PROCEDURE — 99283 EMERGENCY DEPT VISIT LOW MDM: CPT

## 2024-11-10 PROCEDURE — 83874 ASSAY OF MYOGLOBIN: CPT

## 2024-11-10 PROCEDURE — 25010000002 KETOROLAC TROMETHAMINE PER 15 MG

## 2024-11-10 PROCEDURE — 82550 ASSAY OF CK (CPK): CPT

## 2024-11-10 RX ORDER — METHOCARBAMOL 750 MG/1
1500 TABLET, FILM COATED ORAL ONCE
Status: COMPLETED | OUTPATIENT
Start: 2024-11-10 | End: 2024-11-10

## 2024-11-10 RX ORDER — MELOXICAM 7.5 MG/1
7.5 TABLET ORAL DAILY
Qty: 7 TABLET | Refills: 0 | Status: SHIPPED | OUTPATIENT
Start: 2024-11-10 | End: 2024-11-17

## 2024-11-10 RX ORDER — LIDOCAINE 50 MG/G
1 PATCH TOPICAL EVERY 24 HOURS
Qty: 7 PATCH | Refills: 0 | Status: SHIPPED | OUTPATIENT
Start: 2024-11-10 | End: 2024-11-17

## 2024-11-10 RX ORDER — CYCLOBENZAPRINE HCL 10 MG
10 TABLET ORAL 3 TIMES DAILY PRN
Qty: 12 TABLET | Refills: 0 | Status: SHIPPED | OUTPATIENT
Start: 2024-11-10

## 2024-11-10 RX ADMIN — KETOROLAC TROMETHAMINE 30 MG: 30 INJECTION, SOLUTION INTRAMUSCULAR; INTRAVENOUS at 00:09

## 2024-11-10 RX ADMIN — METHOCARBAMOL 1500 MG: 750 TABLET, FILM COATED ORAL at 01:20

## 2024-11-10 NOTE — ED PROVIDER NOTES
EMERGENCY DEPARTMENT ENCOUNTER    Pt Name: Gagan Oconnell  MRN: [id number]  Pt :   1970  Room Number:  24SF/24  Date of encounter:  2024  PCP: Sriram Smith DO  ED Provider: Bashir Savage PA-C    Historian: Patient, nursing notes    HPI:  Chief Complaint: Right leg pain        Context: Gagan Oconnell is a 54 y.o. male who presents to the ED c/o right leg pain for the past 2 nights.  Patient states he had difficulty sleeping at night because when he lays down flat he has pain and numbness in his right thigh.  Patient denies any numbness or tingling, difficulty walking, swelling of the leg, calf pain or swelling, discoloration of skin, or any other concern today.      PAST MEDICAL HISTORY  Past Medical History:   Diagnosis Date    Diabetes mellitus     Kidney stones          PAST SURGICAL HISTORY  History reviewed. No pertinent surgical history.      FAMILY HISTORY  Family History   Problem Relation Age of Onset    Diabetes Father     No Known Problems Son          SOCIAL HISTORY  Social History     Socioeconomic History    Marital status:    Tobacco Use    Smoking status: Never    Smokeless tobacco: Never   Vaping Use    Vaping status: Never Used   Substance and Sexual Activity    Alcohol use: No    Drug use: No    Sexual activity: Defer         ALLERGIES  Patient has no known allergies.        REVIEW OF SYSTEMS  Review of Systems   Constitutional:  Negative for chills and fever.   HENT:  Negative for congestion and sore throat.    Respiratory:  Negative for cough and shortness of breath.    Cardiovascular:  Negative for chest pain.   Gastrointestinal:  Negative for abdominal pain, nausea and vomiting.   Genitourinary:  Negative for dysuria.   Musculoskeletal:  Negative for back pain.        Leg pain   Skin:  Negative for wound.   Neurological:  Negative for dizziness and headaches.   Psychiatric/Behavioral:  Negative for confusion.    All other systems reviewed and are negative.          All systems reviewed and negative except for those discussed in HPI.       PHYSICAL EXAM    I have reviewed the triage vital signs and nursing notes.    ED Triage Vitals [11/09/24 2254]   Temp Heart Rate Resp BP SpO2   97.9 °F (36.6 °C) 83 18 129/94 98 %      Temp src Heart Rate Source Patient Position BP Location FiO2 (%)   Oral Monitor Sitting Left arm --       Physical Exam  Vitals and nursing note reviewed.   Constitutional:       General: He is not in acute distress.     Appearance: Normal appearance. He is not ill-appearing, toxic-appearing or diaphoretic.   HENT:      Head: Normocephalic and atraumatic.      Right Ear: External ear normal.      Left Ear: External ear normal.      Nose: No congestion or rhinorrhea.      Mouth/Throat:      Mouth: Mucous membranes are moist.      Pharynx: Oropharynx is clear.   Eyes:      Conjunctiva/sclera: Conjunctivae normal.   Cardiovascular:      Rate and Rhythm: Normal rate.      Heart sounds: Normal heart sounds.   Pulmonary:      Effort: Pulmonary effort is normal. No respiratory distress.      Breath sounds: Normal breath sounds. No wheezing.   Abdominal:      Tenderness: There is no abdominal tenderness.   Musculoskeletal:      Cervical back: Normal range of motion and neck supple.      Right lower leg: No edema.      Left lower leg: No edema.   Skin:     Findings: No rash.   Neurological:      Mental Status: He is alert.             LAB RESULTS  Recent Results (from the past 24 hours)   Comprehensive Metabolic Panel    Collection Time: 11/10/24 12:20 AM    Specimen: Blood   Result Value Ref Range    Glucose 365 (H) 65 - 99 mg/dL    BUN 10 6 - 20 mg/dL    Creatinine 0.85 0.76 - 1.27 mg/dL    Sodium 132 (L) 136 - 145 mmol/L    Potassium 3.9 3.5 - 5.2 mmol/L    Chloride 98 98 - 107 mmol/L    CO2 18.9 (L) 22.0 - 29.0 mmol/L    Calcium 8.5 (L) 8.6 - 10.5 mg/dL    Total Protein 7.5 6.0 - 8.5 g/dL    Albumin 3.7 3.5 - 5.2 g/dL    ALT (SGPT) 17 1 - 41 U/L    AST (SGOT)  17 1 - 40 U/L    Alkaline Phosphatase 117 39 - 117 U/L    Total Bilirubin 0.4 0.0 - 1.2 mg/dL    Globulin 3.8 gm/dL    A/G Ratio 1.0 g/dL    BUN/Creatinine Ratio 11.8 7.0 - 25.0    Anion Gap 15.1 (H) 5.0 - 15.0 mmol/L    eGFR 103.9 >60.0 mL/min/1.73   CK    Collection Time: 11/10/24 12:20 AM    Specimen: Blood   Result Value Ref Range    Creatine Kinase 96 20 - 200 U/L   Myoglobin, Serum    Collection Time: 11/10/24 12:20 AM    Specimen: Blood   Result Value Ref Range    Myoglobin <21.0 (L) 28.0 - 72.0 ng/mL   CBC Auto Differential    Collection Time: 11/10/24 12:20 AM    Specimen: Blood   Result Value Ref Range    WBC 7.73 3.40 - 10.80 10*3/mm3    RBC 5.58 4.14 - 5.80 10*6/mm3    Hemoglobin 16.1 13.0 - 17.7 g/dL    Hematocrit 45.3 37.5 - 51.0 %    MCV 81.2 79.0 - 97.0 fL    MCH 28.9 26.6 - 33.0 pg    MCHC 35.5 31.5 - 35.7 g/dL    RDW 12.2 (L) 12.3 - 15.4 %    RDW-SD 35.8 (L) 37.0 - 54.0 fl    MPV 8.6 6.0 - 12.0 fL    Platelets 237 140 - 450 10*3/mm3    Neutrophil % 52.3 42.7 - 76.0 %    Lymphocyte % 37.9 19.6 - 45.3 %    Monocyte % 7.4 5.0 - 12.0 %    Eosinophil % 1.8 0.3 - 6.2 %    Basophil % 0.5 0.0 - 1.5 %    Immature Grans % 0.1 0.0 - 0.5 %    Neutrophils, Absolute 4.04 1.70 - 7.00 10*3/mm3    Lymphocytes, Absolute 2.93 0.70 - 3.10 10*3/mm3    Monocytes, Absolute 0.57 0.10 - 0.90 10*3/mm3    Eosinophils, Absolute 0.14 0.00 - 0.40 10*3/mm3    Basophils, Absolute 0.04 0.00 - 0.20 10*3/mm3    Immature Grans, Absolute 0.01 0.00 - 0.05 10*3/mm3    nRBC 0.0 0.0 - 0.2 /100 WBC       If labs were ordered, I independently reviewed the results and considered them in treating the patient.        RADIOLOGY  No Radiology Exams Resulted Within Past 24 Hours    I ordered and independently reviewed the above noted radiographic studies.      I viewed images of right femur xray which showed no acute fracture per my independent interpretation.    See radiologist's dictation for official interpretation.         PROCEDURES    Procedures    No orders to display       MEDICATIONS GIVEN IN ER    Medications   ketorolac (TORADOL) injection 30 mg (30 mg Intramuscular Given 11/10/24 0009)   methocarbamol (ROBAXIN) tablet 1,500 mg (1,500 mg Oral Given 11/10/24 0120)         MEDICAL DECISION MAKING, PROGRESS, and CONSULTS    All labs, if obtained, have been independently reviewed by me.  All radiology studies, if obtained, have been reviewed by me and the radiologist dictating the report.  All EKG's, if obtained, have been independently viewed and interpreted by me/my attending physician.      Discussion below represents my analysis of pertinent findings related to patient's condition, differential diagnosis, treatment plan and final disposition.    Patient is a 54-year-old male presenting to the ER for evaluation of upper thigh pain worse when laying flat.  Exam of the patient shows his vital signs and pulse oximetry are stable within normal limits.  His right upper extremity is neurovascularly intact with easily dopplerable pulses of the DP and PT arteries, normal color, no calf size disparity no calf tenderness, no palpable cords, I have no suspicion for acute arterial occlusion or ischemia, or DVT based on my exam.  A right femur x-ray was performed at the patient's request due to concern for possible fracture, my interpretation of this x-ray was negative.  Laboratory workup was issued to rule out more morbid condition or nontraumatic rhabdomyolysis, CK and myoglobin were unremarkable lowering any suspicion for this, CBC unremarkable no leukocytosis normal hemoglobin.    I suspect patient's pain is primarily due to a pinched nerve, meralgia paresthetica, or early signs of diabetic neuropathy.  Will treat his pain with anti-inflammatories and muscle relaxers and encourage close follow-up with orthopedic surgery as well as with his primary care provider for further evaluation, do not suspect more morbid condition, patient  discharged home in stable condition risk of complication low strict ED return precautions were explained and he verbalized understanding.                     Differential diagnosis:    Differential diagnosis included but was not limited to nontraumatic rhabdomyolysis,      Additional sources:    - Discussed/ obtained information from independent historians:  none    - External (non-ED) record review: I reviewed the patient's record from ED visit on 9/16/2024 showing patient was treated at that time for a diabetic ulcer of the left great toe.    - Chronic or social conditions impacting care: None    Orders placed during this visit:  Orders Placed This Encounter   Procedures    XR Femur 2 View Right    Comprehensive Metabolic Panel    CK    Myoglobin, Serum    CBC Auto Differential    Ambulatory Referral to Orthopedic Surgery    CBC & Differential         Additional orders considered but not ordered: None      ED Course:    Consultants: None                Shared Decision Making:  After my consideration of clinical presentation and any laboratory/radiology studies obtained, I discussed the findings with the patient/patient representative who is in agreement with the treatment plan and the final disposition.   Risks and benefits of discharge and/or observation/admission were discussed.       AS OF 02:23 EST VITALS:    BP - 129/94  HR - 83  TEMP - 97.9 °F (36.6 °C) (Oral)  O2 SATS - 98%                  DIAGNOSIS  Final diagnoses:   Right leg pain         DISPOSITION  Discharge home      Please note that portions of this document were completed with voice recognition software.      Bashir Savage PA-C  11/10/24 0223

## 2024-11-10 NOTE — DISCHARGE INSTRUCTIONS
Follow-up with orthopedic surgery if symptoms do not resolve with 3 to 5 days of applying ice and/or heat and using the medications you have been prescribed.

## 2024-11-11 ENCOUNTER — HOSPITAL ENCOUNTER (EMERGENCY)
Facility: HOSPITAL | Age: 54
Discharge: HOME | End: 2024-11-11
Attending: EMERGENCY MEDICINE | Admitting: EMERGENCY MEDICINE
Payer: COMMERCIAL

## 2024-11-11 VITALS
BODY MASS INDEX: 27.2 KG/M2 | WEIGHT: 190 LBS | HEIGHT: 70 IN | TEMPERATURE: 97.8 F | HEART RATE: 93 BPM | OXYGEN SATURATION: 96 % | RESPIRATION RATE: 18 BRPM | SYSTOLIC BLOOD PRESSURE: 146 MMHG | DIASTOLIC BLOOD PRESSURE: 100 MMHG

## 2024-11-11 DIAGNOSIS — R73.9 HYPERGLYCEMIA: ICD-10-CM

## 2024-11-11 DIAGNOSIS — M79.604 RIGHT LEG PAIN: Primary | ICD-10-CM

## 2024-11-11 DIAGNOSIS — M79.661 RIGHT CALF PAIN: ICD-10-CM

## 2024-11-11 DIAGNOSIS — R20.8 BURNING SENSATION: ICD-10-CM

## 2024-11-11 DIAGNOSIS — Z86.39 HISTORY OF HYPERLIPIDEMIA: ICD-10-CM

## 2024-11-11 DIAGNOSIS — Z91.199 H/O NONCOMPLIANCE WITH MEDICAL TREATMENT, PRESENTING HAZARDS TO HEALTH: ICD-10-CM

## 2024-11-11 DIAGNOSIS — Z86.39 HISTORY OF DIABETES MELLITUS: ICD-10-CM

## 2024-11-11 LAB — GLUCOSE BLDC GLUCOMTR-MCNC: 254 MG/DL (ref 70–130)

## 2024-11-11 PROCEDURE — 82948 REAGENT STRIP/BLOOD GLUCOSE: CPT

## 2024-11-11 RX ORDER — OXYCODONE AND ACETAMINOPHEN 5; 325 MG/1; MG/1
1 TABLET ORAL ONCE
Status: COMPLETED | OUTPATIENT
Start: 2024-11-11 | End: 2024-11-11

## 2024-11-11 RX ORDER — OXYCODONE AND ACETAMINOPHEN 5; 325 MG/1; MG/1
1 TABLET ORAL EVERY 4 HOURS PRN
Qty: 12 TABLET | Refills: 0 | Status: SHIPPED | OUTPATIENT
Start: 2024-11-11

## 2024-11-11 RX ADMIN — OXYCODONE HYDROCHLORIDE AND ACETAMINOPHEN 1 TABLET: 5; 325 TABLET ORAL at 01:39

## 2024-11-11 NOTE — ED PROVIDER NOTES
Subjective   History of Present Illness  This is a 54-year-old male that presents to the ER with 3-day history of right leg pain.  Patient was seen and evaluated at [facility] the ER last night for the same symptoms.  He says that pain is constant and he describes it as throbbing and burning sensation.  Initially pain was more localized to the right thigh, but over the last 24 hours, pain is radiating from the right upper leg to the right lower leg.  Patient denies any back pain.  He denies any skin changes or pedal edema to the right lower extremity.  He denies any increased varicose veins, redness, pallor, or coolness to touch.  He denies any numbness or tingling.  He denies any chest pain or shortness of breath.  He is a non-smoker.  Past medical history is significant for uncontrolled diabetes mellitus, kidney stones, and hyperlipidemia.  Patient says he was diagnosed with diabetes mellitus approximately 4 to 5 years ago.  He was prescribed Glucotrol XL 5 mg by mouth daily.  He has not taken any diabetic medications for at least 2 years.  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  He denies any injury or recent fall.  After assessment at [facility] ER, CBC was within normal limits and chemistries were essentially normal other than serum glucose of 365.  Patient had normal CK and myoglobin.  X-ray of the right femur showed no acute bony abnormality.  Patient was prescribed meloxicam, Flexeril, and Lidoderm patches.  Patient has not had any improvement in symptoms.  He presents to the ER for persistent pain.  Patient also says that he was not prescribed any diabetes medications from the ER last night and he believes that he needs meds for his diabetes.    History provided by:  Patient  Leg Pain  Location:  Leg  Time since incident:  3 days  Injury: no    Leg location:  R upper leg and R lower leg  Pain details:     Quality:   Throbbing and burning    Severity:  Moderate    Timing:  Constant    Progression:  Unchanged  Chronicity:  New  Prior injury to area:  No  Relieved by:  Nothing  Worsened by:  Nothing  Ineffective treatments: Rx for Flexeril, Mobic, and Lidoderm topical patches.  Associated symptoms: no back pain, no decreased ROM, no fatigue, no fever, no itching, no muscle weakness, no neck pain, no numbness, no stiffness, no swelling and no tingling    Risk factors comment:  Diagnosed with Type 2 DM 4-5 years ago; Rx for Glucotrol XL 5 mg 5 years ago, but no meds x 2 years. Seen at [facility] and x-rays of femur were negative for bony abnormalities. Serum glucose was 365.      Review of Systems   Constitutional: Negative.  Negative for fatigue and fever.   HENT: Negative.     Respiratory: Negative.  Negative for shortness of breath.         Non-smoker.   Cardiovascular: Negative.  Negative for chest pain, palpitations and leg swelling (No pedal edema to bilateral lower extremities).   Gastrointestinal: Negative.  Negative for abdominal pain, constipation, diarrhea, nausea and vomiting.   Endocrine: Negative for polydipsia, polyphagia and polyuria.   Genitourinary: Negative.  Negative for dysuria, flank pain and frequency.   Musculoskeletal: Negative.  Negative for arthralgias, back pain, gait problem, joint swelling, neck pain and stiffness.   Skin: Negative.  Negative for color change, itching, pallor and wound.        No rubor or pallor noted to right lower extremity.  No open skin wounds.  Patient has positive sensation.   Neurological: Negative.  Negative for dizziness, tremors, syncope and numbness (No numbness/tingling to bilateral lower extremities.).   All other systems reviewed and are negative.      Past Medical History:   Diagnosis Date    Diabetes mellitus     Kidney stones        No Known Allergies    No past surgical history on file.    Family History   Problem Relation Age of Onset     Diabetes Father     No Known Problems Son        Social History     Socioeconomic History    Marital status:    Tobacco Use    Smoking status: Never    Smokeless tobacco: Never   Vaping Use    Vaping status: Never Used   Substance and Sexual Activity    Alcohol use: No    Drug use: No    Sexual activity: Defer           Objective   Physical Exam  Vitals and nursing note reviewed.   Constitutional:       General: He is not in acute distress.     Appearance: Normal appearance. He is not ill-appearing, toxic-appearing or diaphoretic.      Comments: No acute sign of pain or distress.  Nontoxic   HENT:      Head: Normocephalic and atraumatic.      Nose: Nose normal.      Mouth/Throat:      Mouth: Mucous membranes are moist.      Pharynx: Oropharynx is clear.      Comments: Oral mucous membranes are moist  Eyes:      Extraocular Movements: Extraocular movements intact.      Conjunctiva/sclera: Conjunctivae normal.      Pupils: Pupils are equal, round, and reactive to light.   Cardiovascular:      Rate and Rhythm: Normal rate and regular rhythm. No extrasystoles are present.     Pulses: Normal pulses.           Dorsalis pedis pulses are 2+ on the right side and 2+ on the left side.        Posterior tibial pulses are 2+ on the right side and 2+ on the left side.      Heart sounds: Normal heart sounds.      Comments: Regular rate and rhythm.  No tachycardia or ectopy.  No pedal edema to lower extremities.  Strong bilateral DP and PT pulses and brisk capillary refill.  No varicose veins to the right lower extremity.  No rubor or pallor or coolness to touch.  Pulmonary:      Effort: Pulmonary effort is normal.      Breath sounds: Normal breath sounds. No decreased breath sounds, wheezing or rhonchi.      Comments: Regular respiratory effort.  Lungs are clear to auscultation bilaterally  Abdominal:      General: Bowel sounds are normal.      Palpations: Abdomen is soft.   Musculoskeletal:         General: Normal range of  motion.      Cervical back: Normal range of motion and neck supple.      Right lower leg: No edema.      Left lower leg: No edema.      Comments: No bony tenderness to the right lower extremity.  Normal gait.  No joint effusion or swelling to the right knee.  Patient has mild tenderness all along the right calf, but there are no localized skin changes and no tightness or spasm.  No other tenderness to right lower extremity.  No LS spinal tenderness and no pelvic or hip tenderness or tenderness to the bilateral buttocks.   Skin:     General: Skin is warm and dry.      Findings: No bruising, ecchymosis, erythema, lesion, rash or wound.      Comments: The skin to the right lower extremity looks good.  There is no edema, bruising, skin lesions or rash.  No open wounds or break in the skin.  No concern for diabetic ulcer.   Neurological:      General: No focal deficit present.      Mental Status: He is alert and oriented to person, place, and time.      Sensory: Sensation is intact. No sensory deficit.      Motor: Motor function is intact.      Coordination: Coordination is intact.      Gait: Gait is intact.      Comments: Neuro intact and nonfocal.  Good sensation to bilateral lower extremities.   Psychiatric:         Mood and Affect: Mood and affect normal.         Speech: Speech normal.         Behavior: Behavior normal. Behavior is cooperative.         Thought Content: Thought content normal.         Cognition and Memory: Cognition and memory normal.         Judgment: Judgment normal.      Comments: Normal mood and affect     Prescription sent    Procedures           ED Course  ED Course as of 11/11/24 0115   Mon Nov 11, 2024   0033 I have reviewed ER evaluation from 11/9/2024 at [facility].  Patient had normal x-rays of the right femur and normal CBC and normal CK and myoglobin.  Chemistries were essentially normal other than serum glucose of 365 and sodium of 132.  On today's exam, patient has no  skin changes and no pedal edema.  Patient has good sensation to right lower extremity and strong right DP and PT pulses.  No evidence of varicose veins or pallor/rubor/coolness to touch.  Discussed the case with Dr. Alvares, ER attending physician.  We ordered Accu-Chek.  Patient does have history of noncompliance and has not been utilizing any treatment for type 2 diabetes mellitus for 2 years.  Serum glucose on 11/9/2024 through [facility] was 365.  We definitely will give patient endocrinology referral for long-term management of diabetes mellitus. [FC]   0035 Differential diagnoses includes diabetic peripheral neuropathy with burning sensation to the right lower extremity, radicular pain from lumbar disease, but patient denies any back pain at this time.  Differential also includes right lower extremity musculoskeletal strain. [FC]   0112 Accu-Chek was 254.  Discussed the case and all diagnostic workup with Dr. Alvares, ER attending physician.  She also personally saw and evaluated the patient.  Patient is neurovascularly intact to the right lower extremity.  We will refer him to endocrinology and give him phone number for Dr. Angus Perez.  Will prescribe short course of Percocet 5 mg by mouth every 4-6 hours as needed for moderate pain dispense 12 no refills and metformin 500 mg by mouth twice daily dispense 60 no refills.  We also will set patient up for ultrasound of the right lower extremity in the morning to rule out DVT.  Patient, however has strong distal pulses and no swelling or redness to the right lower leg.  Patient is agreeable with above treatment plan. [FC]      ED Course User Index  [FC] Elaina Allred, AGUSTINA                    No data recorded                     BRIDGER reviewed by Tess Alvares MD  Recent Results (from the past 24 hours)   POC Glucose Once    Collection Time: 11/11/24 12:54 AM    Specimen: Blood   Result Value Ref Range    Glucose 254 (H) 70 - 130 mg/dL       Note:  "In addition to lab results from this visit, the labs listed above may include labs taken at another facility or during a different encounter within the last 24 hours. Please correlate lab times with ED admission and discharge times for further clarification of the services performed during this visit.    No orders to display     Vitals:    11/10/24 2331   BP: (!) 141/122   BP Location: Left arm   Patient Position: Sitting   Pulse: 93   Resp: 20   Temp: 97.8 °F (36.6 °C)   TempSrc: Oral   SpO2: 98%   Weight: 86.2 kg (190 lb)   Height: 177.8 cm (70\")     Medications   oxyCODONE-acetaminophen (PERCOCET) 5-325 MG per tablet 1 tablet (has no administration in time range)     ECG/EMG Results (last 24 hours)       ** No results found for the last 24 hours. **          No orders to display          BRIDGER query complete. Treatment plan to include limited course of prescribed  controlled substance. Risks including addiction, benefits, and alternatives presented to patient.       Medical Decision Making      Final diagnoses:   Right leg pain   Right calf pain   Hyperglycemia   History of diabetes mellitus   H/O noncompliance with medical treatment, presenting hazards to health   History of hyperlipidemia   Burning sensation       ED Disposition  ED Disposition       ED Disposition   Discharge    Condition   Stable    Comment   --               Angus Perez MD  [address]  [address]  [facility] [id number] [phone]    Call in 1 day  Call tomorrow to establish care with endocrinology for long-term management of diabetes mellitus    Sriram Smith DO  [address]  [address]  [facility] [id number] [phone]    Schedule an appointment as soon as possible for a visit in 2 days  Close PCP follow-up for [facility] EMERGENCY DEPARTMENT  [address] [facility] [address] [phone]    If symptoms worsen         Medication List        New Prescriptions  "     metFORMIN 500 MG tablet  Commonly known as: GLUCOPHAGE  Take 1 tablet by mouth 2 (Two) Times a Day With Meals.     oxyCODONE-acetaminophen 5-325 MG per tablet  Commonly known as: PERCOCET  Take 1 tablet by mouth Every 4 (Four) Hours As Needed for Moderate Pain.            Stop      glipizide 5 MG ER tablet  Commonly known as: GLUCOTROL XL               Where to Get Your Medications        These medications were sent to [facility]/pharmacy #[id number] - [place], [place] - [address] [place] [address] [phone]  - [phone] [address] [address]      Phone: [phone]   metFORMIN 500 MG tablet  oxyCODONE-acetaminophen 5-325 MG per tablet            Elaina Allred PA-C  11/11/24 0114       Elaina Allred PA-C  11/11/24 0115

## 2024-11-11 NOTE — DISCHARGE INSTRUCTIONS
ER evaluation revealed Accu-Chek 255.  We will prescribe metformin 500 mg by mouth twice daily dispense 60 no refills.  We also will prescribe short course of Percocet 5 mg by mouth every 4-6 hours as needed for moderate pain dispense 12 no refills.  We are setting patient up for ultrasound of the right lower extremity in the morning to rule out any type of blood clot.  Exam of the right lower extremity is reassuring with strong distal pulses and no swelling, redness, or warmth.  We will give endocrinology follow-up info for Dr. Perez with phone number above for long-term management of diabetes mellitus.  Return to the ER for any worsening symptoms.    If you have a Duplex Venous study ordered and have not received a phone call to schedule this test by 10:00 am tomorrow, please call.    [phone] (Monday - Friday)    [phone] (Weekends)

## 2024-11-13 ENCOUNTER — HOSPITAL ENCOUNTER (OUTPATIENT)
Facility: HOSPITAL | Age: 54
Discharge: HOME | End: 2024-11-13
Payer: COMMERCIAL

## 2024-11-13 VITALS — HEIGHT: 70 IN | WEIGHT: 190.04 LBS | BODY MASS INDEX: 27.21 KG/M2

## 2024-11-13 DIAGNOSIS — M79.604 RIGHT LEG PAIN: ICD-10-CM

## 2024-11-13 DIAGNOSIS — M79.661 RIGHT CALF PAIN: ICD-10-CM

## 2024-11-13 LAB
BH CV LOWER VASCULAR LEFT COMMON FEMORAL AUGMENT: NORMAL
BH CV LOWER VASCULAR LEFT COMMON FEMORAL COMPRESS: NORMAL
BH CV LOWER VASCULAR LEFT COMMON FEMORAL PHASIC: NORMAL
BH CV LOWER VASCULAR LEFT COMMON FEMORAL SPONT: NORMAL
BH CV LOWER VASCULAR RIGHT COMMON FEMORAL AUGMENT: NORMAL
BH CV LOWER VASCULAR RIGHT COMMON FEMORAL COMPRESS: NORMAL
BH CV LOWER VASCULAR RIGHT COMMON FEMORAL PHASIC: NORMAL
BH CV LOWER VASCULAR RIGHT COMMON FEMORAL SPONT: NORMAL
BH CV LOWER VASCULAR RIGHT DISTAL FEMORAL COMPRESS: NORMAL
BH CV LOWER VASCULAR RIGHT GASTRONEMIUS COMPRESS: NORMAL
BH CV LOWER VASCULAR RIGHT GREATER SAPH AK COMPRESS: NORMAL
BH CV LOWER VASCULAR RIGHT GREATER SAPH BK COMPRESS: NORMAL
BH CV LOWER VASCULAR RIGHT LESSER SAPH COMPRESS: NORMAL
BH CV LOWER VASCULAR RIGHT MID FEMORAL AUGMENT: NORMAL
BH CV LOWER VASCULAR RIGHT MID FEMORAL COMPRESS: NORMAL
BH CV LOWER VASCULAR RIGHT MID FEMORAL PHASIC: NORMAL
BH CV LOWER VASCULAR RIGHT MID FEMORAL SPONT: NORMAL
BH CV LOWER VASCULAR RIGHT PERONEAL COMPRESS: NORMAL
BH CV LOWER VASCULAR RIGHT POPLITEAL AUGMENT: NORMAL
BH CV LOWER VASCULAR RIGHT POPLITEAL COMPRESS: NORMAL
BH CV LOWER VASCULAR RIGHT POPLITEAL PHASIC: NORMAL
BH CV LOWER VASCULAR RIGHT POPLITEAL SPONT: NORMAL
BH CV LOWER VASCULAR RIGHT POSTERIOR TIBIAL COMPRESS: NORMAL
BH CV LOWER VASCULAR RIGHT PROFUNDA FEMORAL COMPRESS: NORMAL
BH CV LOWER VASCULAR RIGHT PROXIMAL FEMORAL COMPRESS: NORMAL
BH CV LOWER VASCULAR RIGHT SAPHENOFEMORAL JUNCTION COMPRESS: NORMAL

## 2024-11-13 PROCEDURE — 93971 EXTREMITY STUDY: CPT

## 2024-11-13 PROCEDURE — 93971 EXTREMITY STUDY: CPT | Performed by: INTERNAL MEDICINE

## 2025-01-07 ENCOUNTER — OFFICE VISIT (OUTPATIENT)
Age: 55
End: 2025-01-07
Payer: COMMERCIAL

## 2025-01-07 VITALS
DIASTOLIC BLOOD PRESSURE: 92 MMHG | WEIGHT: 189 LBS | HEIGHT: 70 IN | SYSTOLIC BLOOD PRESSURE: 154 MMHG | OXYGEN SATURATION: 97 % | BODY MASS INDEX: 27.06 KG/M2 | HEART RATE: 111 BPM

## 2025-01-07 DIAGNOSIS — E11.621 DIABETIC ULCER OF TOE OF LEFT FOOT ASSOCIATED WITH TYPE 2 DIABETES MELLITUS, LIMITED TO BREAKDOWN OF SKIN: ICD-10-CM

## 2025-01-07 DIAGNOSIS — E11.65 UNCONTROLLED DIABETES MELLITUS WITH HYPERGLYCEMIA, WITHOUT LONG-TERM CURRENT USE OF INSULIN: Primary | ICD-10-CM

## 2025-01-07 DIAGNOSIS — L97.521 DIABETIC ULCER OF TOE OF LEFT FOOT ASSOCIATED WITH TYPE 2 DIABETES MELLITUS, LIMITED TO BREAKDOWN OF SKIN: ICD-10-CM

## 2025-01-07 LAB
EXPIRATION DATE: ABNORMAL
EXPIRATION DATE: ABNORMAL
GLUCOSE BLDC GLUCOMTR-MCNC: 450 MG/DL (ref 70–130)
HBA1C MFR BLD: 10.6 % (ref 4.5–5.7)
Lab: ABNORMAL
Lab: ABNORMAL

## 2025-01-07 PROCEDURE — 82947 ASSAY GLUCOSE BLOOD QUANT: CPT | Performed by: PHYSICIAN ASSISTANT

## 2025-01-07 PROCEDURE — 80061 LIPID PANEL: CPT | Performed by: PHYSICIAN ASSISTANT

## 2025-01-07 PROCEDURE — 84443 ASSAY THYROID STIM HORMONE: CPT | Performed by: PHYSICIAN ASSISTANT

## 2025-01-07 PROCEDURE — 83036 HEMOGLOBIN GLYCOSYLATED A1C: CPT | Performed by: PHYSICIAN ASSISTANT

## 2025-01-07 PROCEDURE — 80053 COMPREHEN METABOLIC PANEL: CPT | Performed by: PHYSICIAN ASSISTANT

## 2025-01-07 PROCEDURE — 99204 OFFICE O/P NEW MOD 45 MIN: CPT | Performed by: PHYSICIAN ASSISTANT

## 2025-01-07 PROCEDURE — 84681 ASSAY OF C-PEPTIDE: CPT | Performed by: PHYSICIAN ASSISTANT

## 2025-01-07 PROCEDURE — 36415 COLL VENOUS BLD VENIPUNCTURE: CPT | Performed by: PHYSICIAN ASSISTANT

## 2025-01-07 NOTE — ASSESSMENT & PLAN NOTE
Start applying neosporin and bandage after showering   Good sensation bilateral feet, so may be due to friction with steel toe boot months ago  Recheck in one month  Area is without purulence or erythema surrounding ulcer

## 2025-01-07 NOTE — ASSESSMENT & PLAN NOTE
Diabetes is worsening  Patient refuses insulin therapy today, despite counseling on increased risk for hospitalization, infections, heart attack, kidney failure, amputation and blindness due to extremely elevated blood glucose  Patient also refuses referral to ophthalmology and for diabetes education today  Update labs today    Plan to stop all sugary sodas and candy, avoid white bread, pasta, fruit  Try to eat mostly meat and vegetables until next visit  Increase metformin to two tablets twice a day  Continue to drink a lot of water  Schedule eye exam  Check blood sugar twice a day or once a day in the morning, then after eating next day

## 2025-01-07 NOTE — PROGRESS NOTES
"     Office Note      Date: 2025  Patient Name: Gagan Oconnell  MRN: [id number]  : 1970    Chief Complaint   Patient presents with    Diabetes       History of Present Illness:   Gagan Oconnell is a 54 y.o. male who presents for Diabetes type 2. Diagnosed in: 2018. Treated in past with oral agents metformin. Current treatments: off metformin because he ran out (prescribed in November by ED provider and he took for 1 month). Number of insulin shots per day: none. Checks blood sugar none times a day. Has low blood sugar: no. Aspirin use: No -   . Statin use: No -   . ACE-I/ARB use: No -   .  Polyuria and polydipsia.  Does Not have a regular doctor.  Complaining of right leg numbness and weakness when going from sitting to standing.  Has a glucometer and test strips at home, but has not been checking his blood glucose levels.  NO diarrhea with metformin.  Last eye exam: six years ago.    Subjective     Review of Systems   Constitutional:  Positive for fatigue.   Cardiovascular:  Positive for leg swelling.   Musculoskeletal:  Positive for gait problem and myalgias.   Neurological:  Positive for weakness.         Diabetic Complications:  Eyes: No  Kidneys: No  Feet: Yes - left foot ulcer for about four months, no prior ulcerations  Heart: No    Diet and Exercise:  Meals per day: 3, cut down on bread, agreeable to   Minutes of exercise per week: 150 mins.      The following portions of the patient's history were reviewed and updated as appropriate: allergies, current medications, past family history, past medical history, past social history, past surgical history, and problem list.    Objective     Visit Vitals  /92 (BP Location: Left arm, Patient Position: Sitting)   Pulse 111   Ht 177.8 cm (70\")   Wt 85.7 kg (189 lb)   SpO2 97%   BMI 27.12 kg/m²       Physical Exam:  Physical Exam  Constitutional:       Appearance: Normal appearance. He is obese.   HENT:      Head: Normocephalic " and atraumatic.      Nose: Nose normal.   Eyes:      Conjunctiva/sclera: Conjunctivae normal.   Cardiovascular:      Rate and Rhythm: Normal rate.      Pulses:           Dorsalis pedis pulses are 2+ on the right side and 2+ on the left side.        Posterior tibial pulses are 2+ on the right side and 2+ on the left side.   Pulmonary:      Effort: Pulmonary effort is normal.   Musculoskeletal:        Feet:    Feet:      Right foot:      Protective Sensation: 8 sites tested.  5 sites sensed.      Skin integrity: Skin integrity normal. Callus present.      Toenail Condition: Right toenails are normal.      Left foot:      Protective Sensation:   5 sites sensed.      Skin integrity: Ulcer and callus present.      Toenail Condition: Left toenails are normal.      Comments: Left great toe 5 mm area of ulceration, skin deep, dry, no surrounding erythema, adjacent to callus on same toe  Skin:     General: Skin is warm and dry.   Neurological:      General: No focal deficit present.      Mental Status: He is alert and oriented to person, place, and time. Mental status is at baseline.   Psychiatric:         Mood and Affect: Mood normal.         Behavior: Behavior normal.         Thought Content: Thought content normal.         Judgment: Judgment normal.         Labs:  Fingerstick glucose today above 450    HbA1c  Hemoglobin A1C   Date Value Ref Range Status   01/07/2025 10.6 (A) 4.5 - 5.7 % Final   04/06/2021 9.72 (H) 4.80 - 5.60 % Final   .    CMP  Lab Results   Component Value Date    GLUCOSE 365 (H) 11/10/2024    BUN 10 11/10/2024    CREATININE 0.85 11/10/2024     (L) 11/10/2024    K 3.9 11/10/2024    CL 98 11/10/2024    CALCIUM 8.5 (L) 11/10/2024    PROTEINTOT 7.5 11/10/2024    ALBUMIN 3.7 11/10/2024    ALT 17 11/10/2024    AST 17 11/10/2024    ALKPHOS 117 11/10/2024    BILITOT 0.4 11/10/2024    GLOB 3.8 11/10/2024    AGRATIO 1.0 11/10/2024    BCR 11.8 11/10/2024    ANIONGAP 15.1 (H) 11/10/2024    EGFR 103.9  11/10/2024         Lipid Panel  Lab Results   Component Value Date    HDL Cholesterol 36 (L) 04/06/2021    LDL Cholesterol  133 (H) 04/06/2021    LDL/HDL Ratio 3.60 04/06/2021    Triglycerides 172 (H) 04/06/2021        TSH  Lab Results   Component Value Date    TSH 0.920 04/06/2021    FREET4 1.09 03/20/2016            Assessment / Plan      Assessment & Plan:  Diagnoses and all orders for this visit:    1. Uncontrolled diabetes mellitus with hyperglycemia, without long-term current use of insulin (Primary)  Assessment & Plan:  Diabetes is worsening  Patient refuses insulin therapy today, despite counseling on increased risk for hospitalization, infections, heart attack, kidney failure, amputation and blindness due to extremely elevated blood glucose  Patient also refuses referral to ophthalmology and for diabetes education today  Update labs today    Plan to stop all sugary sodas and candy, avoid white bread, pasta, fruit  Try to eat mostly meat and vegetables until next visit  Increase metformin to two tablets twice a day  Continue to drink a lot of water  Schedule eye exam  Check blood sugar twice a day or once a day in the morning, then after eating next day    Orders:  -     POC Glycosylated Hemoglobin (Hb A1C)  -     POC Glucose, Blood  -     C-Peptide  -     Comprehensive Metabolic Panel  -     Lipid Panel  -     TSH  -     Microalbumin / Creatinine Urine Ratio - Urine, Clean Catch  -     metFORMIN (GLUCOPHAGE) 500 MG tablet; Take 2 tablets by mouth 2 (Two) Times a Day With Meals.  Dispense: 60 tablet; Refill: 1    2. Diabetic ulcer of toe of left foot associated with type 2 diabetes mellitus, limited to breakdown of skin  Assessment & Plan:  Start applying neosporin and bandage after showering   Good sensation bilateral feet, so may be due to friction with steel toe boot months ago  Recheck in one month  Area is without purulence or erythema surrounding ulcer         Current Outpatient Medications    Medication Instructions    metFORMIN (GLUCOPHAGE) 1,000 mg, Oral, 2 Times Daily With Meals      Return in about 4 weeks (around 2/4/2025).    Electronically signed by: Svetlana Main PA-C  01/07/2025

## 2025-01-07 NOTE — PATIENT INSTRUCTIONS
Plan to stop all sugary sodas and candy, avoid white bread, pasta, fruit  Try to eat mostly meat and vegetables until next visit  Increase metformin to two tablets twice a day  Continue to drink a looot of water  Schedule eye exam  Check blood sugar twice a day or once a day in the morning, then after eating next day

## 2025-01-08 LAB
ALBUMIN SERPL-MCNC: 4 G/DL (ref 3.5–5.2)
ALBUMIN/GLOB SERPL: 0.9 G/DL
ALP SERPL-CCNC: 91 U/L (ref 39–117)
ALT SERPL W P-5'-P-CCNC: 26 U/L (ref 1–41)
ANION GAP SERPL CALCULATED.3IONS-SCNC: 18.5 MMOL/L (ref 5–15)
AST SERPL-CCNC: 23 U/L (ref 1–40)
BILIRUB SERPL-MCNC: 0.5 MG/DL (ref 0–1.2)
BUN SERPL-MCNC: 13 MG/DL (ref 6–20)
BUN/CREAT SERPL: 13.5 (ref 7–25)
CALCIUM SPEC-SCNC: 9.6 MG/DL (ref 8.6–10.5)
CHLORIDE SERPL-SCNC: 95 MMOL/L (ref 98–107)
CHOLEST SERPL-MCNC: 245 MG/DL (ref 0–200)
CO2 SERPL-SCNC: 20.5 MMOL/L (ref 22–29)
CREAT SERPL-MCNC: 0.96 MG/DL (ref 0.76–1.27)
EGFRCR SERPLBLD CKD-EPI 2021: 94.5 ML/MIN/1.73
GLOBULIN UR ELPH-MCNC: 4.3 GM/DL
GLUCOSE SERPL-MCNC: 449 MG/DL (ref 65–99)
HDLC SERPL-MCNC: 34 MG/DL (ref 40–60)
LDLC SERPL CALC-MCNC: 166 MG/DL (ref 0–100)
LDLC/HDLC SERPL: 4.81 {RATIO}
POTASSIUM SERPL-SCNC: 4.5 MMOL/L (ref 3.5–5.2)
PROT SERPL-MCNC: 8.3 G/DL (ref 6–8.5)
SODIUM SERPL-SCNC: 134 MMOL/L (ref 136–145)
TRIGL SERPL-MCNC: 238 MG/DL (ref 0–150)
TSH SERPL DL<=0.05 MIU/L-ACNC: 1.26 UIU/ML (ref 0.27–4.2)
VLDLC SERPL-MCNC: 45 MG/DL (ref 5–40)

## 2025-01-09 ENCOUNTER — TELEPHONE (OUTPATIENT)
Dept: ENDOCRINOLOGY | Facility: CLINIC | Age: 55
End: 2025-01-09
Payer: COMMERCIAL

## 2025-01-10 LAB — C PEPTIDE SERPL-MCNC: 7.4 NG/ML (ref 1.1–4.4)

## 2025-01-14 DIAGNOSIS — E11.65 UNCONTROLLED DIABETES MELLITUS WITH HYPERGLYCEMIA, WITHOUT LONG-TERM CURRENT USE OF INSULIN: ICD-10-CM

## 2025-01-14 NOTE — TELEPHONE ENCOUNTER
Rx Refill Note  Requested Prescriptions     Refused Prescriptions Disp Refills    metFORMIN (GLUCOPHAGE) 500 MG tablet 60 tablet 1     Sig: Take 2 tablets by mouth 2 (Two) Times a Day With Meals.      Last office visit with prescribing clinician: 1/7/2025      Next office visit with prescribing clinician: 2/4/2025       Leonor Gillette MA  01/14/25, 15:46 EST

## 2025-01-14 NOTE — TELEPHONE ENCOUNTER
Rx Refill Note  Requested Prescriptions     Pending Prescriptions Disp Refills    metFORMIN (GLUCOPHAGE) 500 MG tablet 360 tablet 1     Sig: Take 2 tablets by mouth 2 (Two) Times a Day With Meals.      Last office visit with prescribing clinician: 1/7/2025      Next office visit with prescribing clinician: 2/4/2025       Leonor Gillette MA  01/14/25, 15:48 EST   Pharmacy requested a 90 day supply.

## 2025-03-02 ENCOUNTER — APPOINTMENT (OUTPATIENT)
Dept: CT IMAGING | Facility: HOSPITAL | Age: 55
End: 2025-03-02
Payer: COMMERCIAL

## 2025-03-02 ENCOUNTER — HOSPITAL ENCOUNTER (EMERGENCY)
Facility: HOSPITAL | Age: 55
Discharge: HOME | End: 2025-03-02
Attending: EMERGENCY MEDICINE | Admitting: EMERGENCY MEDICINE
Payer: COMMERCIAL

## 2025-03-02 VITALS
DIASTOLIC BLOOD PRESSURE: 76 MMHG | WEIGHT: 188 LBS | HEART RATE: 77 BPM | HEIGHT: 70 IN | TEMPERATURE: 98.4 F | BODY MASS INDEX: 26.92 KG/M2 | OXYGEN SATURATION: 96 % | RESPIRATION RATE: 18 BRPM | SYSTOLIC BLOOD PRESSURE: 130 MMHG

## 2025-03-02 DIAGNOSIS — I73.9 PERIPHERAL VASCULAR DISEASE: ICD-10-CM

## 2025-03-02 DIAGNOSIS — M54.16 LUMBAR RADICULOPATHY: Primary | ICD-10-CM

## 2025-03-02 LAB
ALBUMIN SERPL-MCNC: 4.1 G/DL (ref 3.5–5.2)
ALBUMIN/GLOB SERPL: 1.2 G/DL
ALP SERPL-CCNC: 101 U/L (ref 39–117)
ALT SERPL W P-5'-P-CCNC: 18 U/L (ref 1–41)
ANION GAP SERPL CALCULATED.3IONS-SCNC: 10.6 MMOL/L (ref 5–15)
APTT PPP: 25.5 SECONDS (ref 70–100)
AST SERPL-CCNC: 16 U/L (ref 1–40)
BASOPHILS # BLD AUTO: 0.04 10*3/MM3 (ref 0–0.2)
BASOPHILS NFR BLD AUTO: 0.5 % (ref 0–1.5)
BILIRUB SERPL-MCNC: 0.5 MG/DL (ref 0–1.2)
BUN SERPL-MCNC: 11 MG/DL (ref 6–20)
BUN/CREAT SERPL: 15.5 (ref 7–25)
CALCIUM SPEC-SCNC: 8.7 MG/DL (ref 8.6–10.5)
CHLORIDE SERPL-SCNC: 99 MMOL/L (ref 98–107)
CK SERPL-CCNC: 60 U/L (ref 20–200)
CO2 SERPL-SCNC: 25.4 MMOL/L (ref 22–29)
CREAT SERPL-MCNC: 0.71 MG/DL (ref 0.76–1.27)
CRP SERPL-MCNC: <0.3 MG/DL (ref 0–0.5)
DEPRECATED RDW RBC AUTO: 37.7 FL (ref 37–54)
EGFRCR SERPLBLD CKD-EPI 2021: 109 ML/MIN/1.73
EOSINOPHIL # BLD AUTO: 0.29 10*3/MM3 (ref 0–0.4)
EOSINOPHIL NFR BLD AUTO: 3.5 % (ref 0.3–6.2)
ERYTHROCYTE [DISTWIDTH] IN BLOOD BY AUTOMATED COUNT: 12.6 % (ref 12.3–15.4)
ERYTHROCYTE [SEDIMENTATION RATE] IN BLOOD: 6 MM/HR (ref 0–20)
GLOBULIN UR ELPH-MCNC: 3.3 GM/DL
GLUCOSE SERPL-MCNC: 248 MG/DL (ref 65–99)
HCT VFR BLD AUTO: 46.2 % (ref 37.5–51)
HGB BLD-MCNC: 16.5 G/DL (ref 13–17.7)
IMM GRANULOCYTES # BLD AUTO: 0.02 10*3/MM3 (ref 0–0.05)
IMM GRANULOCYTES NFR BLD AUTO: 0.2 % (ref 0–0.5)
INR PPP: 1.07 (ref 0.9–1.1)
LYMPHOCYTES # BLD AUTO: 2.34 10*3/MM3 (ref 0.7–3.1)
LYMPHOCYTES NFR BLD AUTO: 27.9 % (ref 19.6–45.3)
MCH RBC QN AUTO: 29.3 PG (ref 26.6–33)
MCHC RBC AUTO-ENTMCNC: 35.7 G/DL (ref 31.5–35.7)
MCV RBC AUTO: 81.9 FL (ref 79–97)
MONOCYTES # BLD AUTO: 0.49 10*3/MM3 (ref 0.1–0.9)
MONOCYTES NFR BLD AUTO: 5.8 % (ref 5–12)
NEUTROPHILS NFR BLD AUTO: 5.2 10*3/MM3 (ref 1.7–7)
NEUTROPHILS NFR BLD AUTO: 62.1 % (ref 42.7–76)
NRBC BLD AUTO-RTO: 0 /100 WBC (ref 0–0.2)
PLATELET # BLD AUTO: 231 10*3/MM3 (ref 140–450)
PMV BLD AUTO: 8.5 FL (ref 6–12)
POTASSIUM SERPL-SCNC: 4 MMOL/L (ref 3.5–5.2)
PROT SERPL-MCNC: 7.4 G/DL (ref 6–8.5)
PROTHROMBIN TIME: 14.4 SECONDS (ref 12.3–15.1)
RBC # BLD AUTO: 5.64 10*6/MM3 (ref 4.14–5.8)
SODIUM SERPL-SCNC: 135 MMOL/L (ref 136–145)
WBC NRBC COR # BLD AUTO: 8.38 10*3/MM3 (ref 3.4–10.8)

## 2025-03-02 PROCEDURE — 25010000002 DEXAMETHASONE SODIUM PHOSPHATE 10 MG/ML SOLUTION: Performed by: EMERGENCY MEDICINE

## 2025-03-02 PROCEDURE — 25010000002 KETOROLAC TROMETHAMINE PER 15 MG: Performed by: EMERGENCY MEDICINE

## 2025-03-02 PROCEDURE — 85730 THROMBOPLASTIN TIME PARTIAL: CPT | Performed by: EMERGENCY MEDICINE

## 2025-03-02 PROCEDURE — 25510000001 IOPAMIDOL 61 % SOLUTION: Performed by: EMERGENCY MEDICINE

## 2025-03-02 PROCEDURE — 85025 COMPLETE CBC W/AUTO DIFF WBC: CPT | Performed by: EMERGENCY MEDICINE

## 2025-03-02 PROCEDURE — 82550 ASSAY OF CK (CPK): CPT | Performed by: EMERGENCY MEDICINE

## 2025-03-02 PROCEDURE — 96375 TX/PRO/DX INJ NEW DRUG ADDON: CPT

## 2025-03-02 PROCEDURE — 96374 THER/PROPH/DIAG INJ IV PUSH: CPT

## 2025-03-02 PROCEDURE — 75635 CT ANGIO ABDOMINAL ARTERIES: CPT

## 2025-03-02 PROCEDURE — 85610 PROTHROMBIN TIME: CPT | Performed by: EMERGENCY MEDICINE

## 2025-03-02 PROCEDURE — 80053 COMPREHEN METABOLIC PANEL: CPT | Performed by: EMERGENCY MEDICINE

## 2025-03-02 PROCEDURE — 85652 RBC SED RATE AUTOMATED: CPT | Performed by: EMERGENCY MEDICINE

## 2025-03-02 PROCEDURE — 99285 EMERGENCY DEPT VISIT HI MDM: CPT | Performed by: EMERGENCY MEDICINE

## 2025-03-02 PROCEDURE — 86140 C-REACTIVE PROTEIN: CPT | Performed by: EMERGENCY MEDICINE

## 2025-03-02 RX ORDER — DEXAMETHASONE SODIUM PHOSPHATE 10 MG/ML
10 INJECTION, SOLUTION INTRAMUSCULAR; INTRAVENOUS ONCE
Status: COMPLETED | OUTPATIENT
Start: 2025-03-02 | End: 2025-03-02

## 2025-03-02 RX ORDER — OXYCODONE HYDROCHLORIDE 5 MG/1
5 TABLET ORAL ONCE
Status: COMPLETED | OUTPATIENT
Start: 2025-03-02 | End: 2025-03-02

## 2025-03-02 RX ORDER — METHYLPREDNISOLONE 4 MG/1
TABLET ORAL
Qty: 21 TABLET | Refills: 0 | Status: SHIPPED | OUTPATIENT
Start: 2025-03-02

## 2025-03-02 RX ORDER — OXYCODONE HYDROCHLORIDE 5 MG/1
5 TABLET ORAL EVERY 4 HOURS PRN
Qty: 12 TABLET | Refills: 0 | Status: SHIPPED | OUTPATIENT
Start: 2025-03-02

## 2025-03-02 RX ORDER — IOPAMIDOL 612 MG/ML
100 INJECTION, SOLUTION INTRAVASCULAR
Status: COMPLETED | OUTPATIENT
Start: 2025-03-02 | End: 2025-03-02

## 2025-03-02 RX ORDER — SODIUM CHLORIDE 0.9 % (FLUSH) 0.9 %
10 SYRINGE (ML) INJECTION AS NEEDED
Status: DISCONTINUED | OUTPATIENT
Start: 2025-03-02 | End: 2025-03-02 | Stop reason: HOSPADM

## 2025-03-02 RX ORDER — GABAPENTIN 100 MG/1
100 CAPSULE ORAL 3 TIMES DAILY
Qty: 90 CAPSULE | Refills: 0 | Status: SHIPPED | OUTPATIENT
Start: 2025-03-02 | End: 2025-04-01

## 2025-03-02 RX ORDER — KETOROLAC TROMETHAMINE 30 MG/ML
15 INJECTION, SOLUTION INTRAMUSCULAR; INTRAVENOUS ONCE
Status: COMPLETED | OUTPATIENT
Start: 2025-03-02 | End: 2025-03-02

## 2025-03-02 RX ADMIN — OXYCODONE HYDROCHLORIDE 5 MG: 5 TABLET ORAL at 07:33

## 2025-03-02 RX ADMIN — KETOROLAC TROMETHAMINE 15 MG: 30 INJECTION, SOLUTION INTRAMUSCULAR; INTRAVENOUS at 08:14

## 2025-03-02 RX ADMIN — DEXAMETHASONE SODIUM PHOSPHATE 10 MG: 10 INJECTION INTRAMUSCULAR; INTRAVENOUS at 08:15

## 2025-03-02 RX ADMIN — IOPAMIDOL 100 ML: 612 INJECTION, SOLUTION INTRAVENOUS at 08:46

## 2025-03-02 NOTE — ED PROVIDER NOTES
EMERGENCY DEPARTMENT ENCOUNTER    Pt Name: Gagan Oconnell  MRN: [id number]  Pt :   1970  Room Number:    Date of encounter:  3/2/2025  PCP: Provider, No Known  ED Provider: Carson Burkett MD    Historian: Patient      HPI:  Chief Complaint   Patient presents with    Leg Pain          Context: Gagan Oconnell is a 55 y.o. male who presents to the ED c/o right leg pain, has been here in other facilities a couple times for right leg pain, got acutely worse over the last several days, unable to sleep or lay on that side.  He has had a negative DVT study, an x-ray of his femur, and labs including CK which were all negative.  Patient denies numbness in the leg, denies fall, injury      PAST MEDICAL HISTORY  Past Medical History:   Diagnosis Date    Diabetes mellitus     Kidney stones          PAST SURGICAL HISTORY  History reviewed. No pertinent surgical history.      FAMILY HISTORY  Family History   Problem Relation Age of Onset    Diabetes Father     No Known Problems Son          SOCIAL HISTORY  Social History     Socioeconomic History    Marital status:    Tobacco Use    Smoking status: Never     Passive exposure: Never    Smokeless tobacco: Never   Vaping Use    Vaping status: Never Used   Substance and Sexual Activity    Alcohol use: No    Drug use: No    Sexual activity: Defer         ALLERGIES  Patient has no known allergies.        REVIEW OF SYSTEMS  Review of Systems   Constitutional:  Negative for chills and fever.   HENT:  Negative for sore throat and trouble swallowing.    Eyes:  Negative for pain and redness.   Respiratory:  Negative for cough and shortness of breath.    Cardiovascular:  Negative for chest pain and leg swelling.   Gastrointestinal:  Negative for abdominal pain, nausea and vomiting.   Genitourinary:  Negative for dysuria and urgency.   Musculoskeletal:  Negative for back pain and neck pain.        Right lower extremity pain   Skin:  Negative  for rash and wound.   Neurological:  Negative for dizziness and weakness.        All systems reviewed and negative except for those discussed in HPI.       PHYSICAL EXAM    I have reviewed the triage vital signs and nursing notes.    ED Triage Vitals [03/02/25 0652]   Temp Heart Rate Resp BP SpO2   98.1 °F (36.7 °C) 77 16 144/88 99 %      Temp src Heart Rate Source Patient Position BP Location FiO2 (%)   Oral Monitor Sitting Left arm --       Physical Exam  Constitutional:       Appearance: Normal appearance. He is not ill-appearing.   HENT:      Head: Normocephalic and atraumatic.      Right Ear: External ear normal.      Left Ear: External ear normal.      Nose: Nose normal.      Mouth/Throat:      Mouth: Mucous membranes are moist.      Pharynx: Oropharynx is clear.   Eyes:      Extraocular Movements: Extraocular movements intact.      Conjunctiva/sclera: Conjunctivae normal.      Pupils: Pupils are equal, round, and reactive to light.   Cardiovascular:      Rate and Rhythm: Normal rate and regular rhythm.      Pulses:           Radial pulses are 2+ on the right side and 2+ on the left side.      Heart sounds: No murmur heard.  Pulmonary:      Effort: Pulmonary effort is normal.      Breath sounds: Normal breath sounds.   Abdominal:      General: There is no distension.      Tenderness: There is no abdominal tenderness. There is no guarding.   Musculoskeletal:         General: No swelling or deformity.      Cervical back: Normal range of motion and neck supple.   Skin:     General: Skin is warm and dry.      Capillary Refill: Capillary refill takes less than 2 seconds.      Findings: No rash.   Neurological:      General: No focal deficit present.      Mental Status: He is alert and oriented to person, place, and time.            LAB RESULTS  Recent Results (from the past 24 hours)   Comprehensive Metabolic Panel    Collection Time: 03/02/25  8:18 AM    Specimen: Blood   Result Value Ref Range    Glucose 248 (H)  65 - 99 mg/dL    BUN 11 6 - 20 mg/dL    Creatinine 0.71 (L) 0.76 - 1.27 mg/dL    Sodium 135 (L) 136 - 145 mmol/L    Potassium 4.0 3.5 - 5.2 mmol/L    Chloride 99 98 - 107 mmol/L    CO2 25.4 22.0 - 29.0 mmol/L    Calcium 8.7 8.6 - 10.5 mg/dL    Total Protein 7.4 6.0 - 8.5 g/dL    Albumin 4.1 3.5 - 5.2 g/dL    ALT (SGPT) 18 1 - 41 U/L    AST (SGOT) 16 1 - 40 U/L    Alkaline Phosphatase 101 39 - 117 U/L    Total Bilirubin 0.5 0.0 - 1.2 mg/dL    Globulin 3.3 gm/dL    A/G Ratio 1.2 g/dL    BUN/Creatinine Ratio 15.5 7.0 - 25.0    Anion Gap 10.6 5.0 - 15.0 mmol/L    eGFR 109.0 >60.0 mL/min/1.73   Protime-INR    Collection Time: 03/02/25  8:18 AM    Specimen: Blood   Result Value Ref Range    Protime 14.4 12.3 - 15.1 Seconds    INR 1.07 0.90 - 1.10   aPTT    Collection Time: 03/02/25  8:18 AM    Specimen: Blood   Result Value Ref Range    PTT 25.5 (L) 70.0 - 100.0 seconds   CK    Collection Time: 03/02/25  8:18 AM    Specimen: Blood   Result Value Ref Range    Creatine Kinase 60 20 - 200 U/L   CBC Auto Differential    Collection Time: 03/02/25  8:18 AM    Specimen: Blood   Result Value Ref Range    WBC 8.38 3.40 - 10.80 10*3/mm3    RBC 5.64 4.14 - 5.80 10*6/mm3    Hemoglobin 16.5 13.0 - 17.7 g/dL    Hematocrit 46.2 37.5 - 51.0 %    MCV 81.9 79.0 - 97.0 fL    MCH 29.3 26.6 - 33.0 pg    MCHC 35.7 31.5 - 35.7 g/dL    RDW 12.6 12.3 - 15.4 %    RDW-SD 37.7 37.0 - 54.0 fl    MPV 8.5 6.0 - 12.0 fL    Platelets 231 140 - 450 10*3/mm3    Neutrophil % 62.1 42.7 - 76.0 %    Lymphocyte % 27.9 19.6 - 45.3 %    Monocyte % 5.8 5.0 - 12.0 %    Eosinophil % 3.5 0.3 - 6.2 %    Basophil % 0.5 0.0 - 1.5 %    Immature Grans % 0.2 0.0 - 0.5 %    Neutrophils, Absolute 5.20 1.70 - 7.00 10*3/mm3    Lymphocytes, Absolute 2.34 0.70 - 3.10 10*3/mm3    Monocytes, Absolute 0.49 0.10 - 0.90 10*3/mm3    Eosinophils, Absolute 0.29 0.00 - 0.40 10*3/mm3    Basophils, Absolute 0.04 0.00 - 0.20 10*3/mm3    Immature Grans, Absolute 0.02 0.00 - 0.05 10*3/mm3     nRBC 0.0 0.0 - 0.2 /100 WBC   Sedimentation Rate    Collection Time: 03/02/25  8:18 AM    Specimen: Blood   Result Value Ref Range    Sed Rate 6 0 - 20 mm/hr   C-reactive Protein    Collection Time: 03/02/25  8:18 AM    Specimen: Blood   Result Value Ref Range    C-Reactive Protein <0.30 0.00 - 0.50 mg/dL       If labs were ordered, I independently reviewed the results and considered them in treating the patient.        RADIOLOGY  CT Angio Abdominal Aorta Bilateral Iliofem Runoff    Result Date: 3/2/2025  PROCEDURE: CT ANGIO ABDOMINAL AORTA BILAT ILIOFEM RUNOFF-  CT ABDOMEN, CT PELVIS,  CTA ABDOMEN, CTA PELVIS AND CTA LOWER EXTREMITY RUNOFF  HISTORY: Peripheral vascular disease , severe pain, sensation changes  COMPARISON: CT abdomen and pelvis 06/07/2017.  TECHNIQUE: Thin section axial CT with IV contrast supplemented with multiplanar reconstruction under CT Angiogram protocol.   3-D reconstructions were performed.   FINDINGS:  CT ANGIOGRAM ABDOMEN AND PELVIS: No aortic aneurysm or dissection identified. The SMA is patent. There are single nonstenotic bilateral renal arteries. Noted is separate origins of the right gastric and splenic arteries which appear normal.  CTA RIGHT LOWER EXTREMITY: Right common, internal and external iliac arteries appear normal as does the right common femoral artery. Right profunda is patent. Mild calcified plaque identified throughout the right superficial femoral artery with mild disease in the right popliteal artery. There is diffuse calcified plaque in the right posterior tibial artery with moderate stenoses identified in the distal right lower leg. This is patent into the distal foot. There is mild calcified plaque throughout the right anterior tibial artery more prominent in the distal right lower leg. Right anterior tibial artery is patent into the right distal foot. Right peroneal artery demonstrates diffuse mild calcific plaque and is patent into the hindfoot. Distal right  great toe not included on this study.  CTA LEFT LOWER EXTREMITY: Minimal calcified plaque identified in the left common iliac artery with no significant stenosis. The left internal and external iliac arteries and left common femoral artery as well as the profunda appear normal. There is minimal calcified disease in the left SFA without significant stenosis. Mild, diffuse disease noted in the left popliteal artery as well. There is a moderate stenosis at the origin of the right posterior tibial artery with mild disease throughout more prominent in the distal left lower leg. Left posterior tibial artery is patent into the distal foot. Peroneal artery also demonstrates diffuse, mild disease and is patent into the posterior foot. Left anterior tibial artery demonstrates diffuse calcified plaque and is patent into the distal left foot. Abdomen: Lung bases are clear and heart size is normal. Calcified granulomata identified. Again noted is fatty infiltration of the liver and enlargement similar to the prior exam. Gallbladder is present and appears mildly distended, similar to prior. Spleen is normal in size. Normal appearance of the adrenals as well as the kidneys. Pancreas appears normal with no dilated duct or mass identified.  Pelvis: Appendix identified and appears normal. Bladder is moderately filled and appears normal. Prostate is normal in size and no free fluid is seen. No evidence of bowel obstruction identified. There is moderate stool burden suggesting constipation.      No significant vascular disease in the abdomen or pelvis.  Diffuse vascular disease in the lower extremities bilaterally in a fairly symmetric fashion, most prominent in the distal lower legs. Three-vessel patency identified into the feet bilaterally.  Constipation. This study was performed with techniques to keep radiation doses as low as reasonably achievable (ALARA). Individualized dose reduction techniques using automated exposure control  or adjustment of vA and/or kV according to the patient size were employed.            PROCEDURES    Procedures    Interpretations    O2 Sat: The patient's oxygen saturation was 99% on Room Air.  This was independently interpreted by me as Normal      Radiology: I ordered and independently reviewed the above noted radiographic studies.  I viewed images of  CT right lower extremity  which showed  three vessel runoff  per my independent interpretation. See radiologist's dictation for official interpretation.         MEDICATIONS GIVEN IN ER    Medications   sodium chloride 0.9 % flush 10 mL (has no administration in time range)   ketorolac (TORADOL) injection 15 mg (15 mg Intravenous Given 3/2/25 0814)   dexAMETHasone sodium phosphate injection 10 mg (10 mg Intravenous Given 3/2/25 0815)   oxyCODONE (ROXICODONE) immediate release tablet 5 mg (5 mg Oral Given 3/2/25 0733)   iopamidol (ISOVUE-300) 61 % injection 100 mL (100 mL Intravenous Given 3/2/25 0846)         MEDICAL DECISION MAKING, PROGRESS, and CONSULTS    All labs, if obtained, have been independently reviewed by me.  All radiology studies, if obtained, have been reviewed by me and the radiologist dictating the report.  All EKG's, if obtained, have been independently viewed and interpreted by me      Discussion below represents my analysis of pertinent findings related to patient's condition, differential diagnosis, treatment plan and final disposition.      Differential diagnosis:    55-year-old male with right lower extremity pain, unclear etiology of the pain, has had negative DVT studies, normal x-rays, benign lab workup and certainly could have sciatica, he has not sought any and specialist care as multiple ED visits, however the pain is getting acutely worse, its diffuse, all the way to the foot.  PV vascular compromise, versus DVT missed on the first ultrasound, will obtain CTA of the right lower extremity, laboratory work including CK, provide IV pain  medications    Additional Sources:  External (non-ED) record review:  ED note 11/10/2024 11/9/2024 regarding lower extremity pain       Orders placed during this visit:  Orders Placed This Encounter   Procedures    CT Angio Abdominal Aorta Bilateral Iliofem Runoff    Comprehensive Metabolic Panel    Protime-INR    aPTT    CK    CBC Auto Differential    Sedimentation Rate    C-reactive Protein    Ambulatory Referral to Vascular Surgery    Ambulatory Referral to Orthopedic Surgery    Insert Peripheral IV    CBC & Differential         Additional orders considered but not ordered:  None    ED Course:    Consultants:  None    ED Course as of 03/02/25 0950   Sun Mar 02, 2025   0859 Comprehensive Metabolic Panel(!)  Chemistries are benign [CS]   0859 CBC & Differential  CBC is benign [CS]   0859 Protime-INR  Coagulation studies normal [CS]   0859 CK  CK negative for muscle breakdown [CS]   0859 Sedimentation Rate  Sed rate benign [CS]   0923 CTA of the legs does show some peripheral vascular disease, no obstruction, has three-vessel runoff to the feet, patient's pain does not sound like a rest pain, I do not think he would necessarily benefit from colistazol at this point, his pain does get better with leaning forward and ambulating, which certainly speaks to more of a spinal stenosis type pain.  Will refer to orthospine, as well as vascular surgery.  Advised patient to return to the ED for new or concerning symptoms. [CS]      ED Course User Index  [CS] Carson Burkett MD           After my consideration of clinical presentation and any laboratory/radiology studies obtained, I discussed the findings with the patient/patient representative who is in agreement with the treatment plan and the final disposition. Risks and benefits of discharge were discussed.     AS OF 09:50 EST VITALS:    BP - 144/88  HR - 77  TEMP - 98.1 °F (36.7 °C) (Oral)  O2 SATS - 99%    I reviewed the patient's prescription monitoring report  if available prior to discharge    DIAGNOSIS  Final diagnoses:   Lumbar radiculopathy   Peripheral vascular disease         DISPOSITION  ED Disposition       ED Disposition   Discharge    Condition   Stable    Comment   --                   Please note that portions of this document were completed with voice recognition software.        Carson Burkett MD  03/02/25 8889

## 2025-03-11 ENCOUNTER — TELEPHONE (OUTPATIENT)
Dept: CARDIAC SURGERY | Facility: CLINIC | Age: 55
End: 2025-03-11
Payer: COMMERCIAL

## 2025-03-11 NOTE — TELEPHONE ENCOUNTER
"Returned patient's call to advise that no sooner appointments available at this time. Patient reports that he is having some leg pain during the day that is tolerable and relieved when he gets up and walks around. However, he is having intolerable \"nerve\" pain at night that radiates from lower back into buttock area. He said that it is the worst pain he has ever felt. He states that he has finished taking his steroid, is out of oxycodone, and the gabapentin does not help him at all. I advised that we do not prescribe any of these medications for PVD and that I would discuss his issues with APRN to see what recommendations we could offer him.     Discussed with JOSE ARMANDO Morris. She reviewed patient's scan and advised that PVD is mild and not urgent. She recommended that patient schedule appointment with PCP to follow up with for lumbar radiculopathy, which is likely the cause of the pain. Otherwise, he could go to local urgent care or ED again for the pain he is having.     Relayed this info to patient. He states that his discharge paperwork states that he needs to follow up with Dr. Porter Harman, [facility]. I advised that if the ED sent a referral he could call Dr. Harman's office to schedule an appointment to discuss lumbar issue/pain. He stated that he already called them and they told him that they couldn't schedule an appointment due to an insurance issue. I recommended that he call his insurance company to ask for an in-network provider recommendation. I also advised that he call us back with any further questions or concerns.     I contacted Dr. Harman's office to see if they take patient's insurance. They confirmed that they do and stated that they have patient scheduled to see Dr. Harman tomorrow morning at 9am. I called patient back to make him aware of this. He states that he was able to get in touch with them again and get the appointment scheduled. Advised that he call back if he needs anything " further before his appointment. He verbalized understanding and agreed.

## 2025-03-11 NOTE — TELEPHONE ENCOUNTER
Caller: Gagan Oconnell    Relationship to patient: Self    Best call back number: [phone]    Chief complaint: PT WOULD LIKE TO MOVE UP NEW PT APPT SOONER AS PT IS OUT OF HIS PRESCRIPTIONS FROM THE EMERGENCY DEPT & EXPRESSES HAVING ISSUES SLEEPING AND ACTIVE PAIN     Type of visit: NEW PT    Requested date: ASAP    If rescheduling, when is the original appointment: 4/8/25    Additional notes:PT NEED MEDICATION REFILL

## 2025-03-22 ENCOUNTER — HOSPITAL ENCOUNTER (EMERGENCY)
Facility: HOSPITAL | Age: 55
Discharge: HOME | End: 2025-03-22
Attending: EMERGENCY MEDICINE
Payer: COMMERCIAL

## 2025-03-22 VITALS
OXYGEN SATURATION: 99 % | WEIGHT: 187 LBS | TEMPERATURE: 98.3 F | DIASTOLIC BLOOD PRESSURE: 93 MMHG | HEART RATE: 92 BPM | RESPIRATION RATE: 18 BRPM | HEIGHT: 70 IN | SYSTOLIC BLOOD PRESSURE: 155 MMHG | BODY MASS INDEX: 26.77 KG/M2

## 2025-03-22 DIAGNOSIS — K59.00 CONSTIPATION, UNSPECIFIED CONSTIPATION TYPE: Primary | ICD-10-CM

## 2025-03-22 PROCEDURE — 99283 EMERGENCY DEPT VISIT LOW MDM: CPT | Performed by: EMERGENCY MEDICINE

## 2025-03-22 RX ORDER — POLYETHYLENE GLYCOL 3350 17 G/17G
17 POWDER, FOR SOLUTION ORAL ONCE
Status: COMPLETED | OUTPATIENT
Start: 2025-03-22 | End: 2025-03-22

## 2025-03-22 RX ORDER — POLYETHYLENE GLYCOL 3350 17 G/17G
17 POWDER, FOR SOLUTION ORAL 2 TIMES DAILY PRN
Qty: 10 EACH | Refills: 0 | Status: SHIPPED | OUTPATIENT
Start: 2025-03-22 | End: 2025-03-27

## 2025-03-22 RX ADMIN — POLYETHYLENE GLYCOL 3350 17 G: 17 POWDER, FOR SOLUTION ORAL at 06:59

## 2025-03-22 NOTE — ED PROVIDER NOTES
[facility] EMERGENCY DEPARTMENT  Emergency Department Encounter  Emergency Medicine Physician Note     Pt Name:Gagan Oconnell  MRN: [id number]  Birthdate 1970  Date of evaluation: 3/22/2025  PCP:  Provider, No Known  Note Started: 6:04 AM EDT      CHIEF COMPLAINT       Chief Complaint   Patient presents with    Abdominal Pain    Constipation       HISTORY OF PRESENT ILLNESS  (Location/Symptom, Timing/Onset, Context/Setting, Quality, Duration, Modifying Factors, Severity.)      Gagan Oconnell is a 55 y.o. male who presents with constipation for multiple days.  Patient describes diffuse abdominal pain has been worsening.  Patient was recently seen for back pain started on opiate medication.  Patient does state that he used some soap to try to do a digital disimpaction last night.  Patient still unable to have a bowel movement.  Patient denies any nausea or vomiting.  Patient denies any fevers or chills.    PAST MEDICAL / SURGICAL / SOCIAL / FAMILY HISTORY     Past Medical History:   Diagnosis Date    Diabetes mellitus     Kidney stones      No additional pertinent       History reviewed. No pertinent surgical history.  No additional pertinent       Social History     Socioeconomic History    Marital status:    Tobacco Use    Smoking status: Never     Passive exposure: Never    Smokeless tobacco: Never   Vaping Use    Vaping status: Never Used   Substance and Sexual Activity    Alcohol use: No    Drug use: No    Sexual activity: Defer       Family History   Problem Relation Age of Onset    Diabetes Father     No Known Problems Son        Allergies:  Patient has no known allergies.    Home Medications:  Prior to Admission medications    Medication Sig Start Date End Date Taking? Authorizing Provider   gabapentin (NEURONTIN) 100 MG capsule Take 1 capsule by mouth 3 (Three) Times a Day for 30 days. 3/2/25 4/1/25  Carson Burkett MD   metFORMIN (GLUCOPHAGE) 500 MG  "tablet Take 2 tablets by mouth 2 (Two) Times a Day With Meals. 1/15/25 1/15/26  Svetlana Main PA-C   methylPREDNISolone (MEDROL) 4 MG dose pack Take as directed on package instructions. 3/2/25   Carson Burkett MD   oxyCODONE (Roxicodone) 5 MG immediate release tablet Take 1 tablet by mouth Every 4 (Four) Hours As Needed for Moderate Pain. 3/2/25   Carson Burkett MD         REVIEW OF SYSTEMS       Review of Systems   Constitutional:  Negative for chills and fever.   Cardiovascular:  Negative for chest pain.   Gastrointestinal:  Positive for abdominal pain, constipation and nausea. Negative for diarrhea and vomiting.   Genitourinary:  Negative for flank pain.   Neurological:  Negative for dizziness and light-headedness.       PHYSICAL EXAM      INITIAL VITALS:   /93 (BP Location: Left arm, Patient Position: Sitting)   Pulse 92   Temp 98.3 °F (36.8 °C) (Oral)   Resp 18   Ht 177.8 cm (70\")   Wt 84.8 kg (187 lb)   SpO2 99%   BMI 26.83 kg/m²     Physical Exam  Constitutional:       Appearance: Normal appearance.   HENT:      Head: Normocephalic and atraumatic.   Eyes:      Extraocular Movements: Extraocular movements intact.   Cardiovascular:      Rate and Rhythm: Normal rate and regular rhythm.   Pulmonary:      Effort: Pulmonary effort is normal.      Breath sounds: Normal breath sounds.   Abdominal:      General: Abdomen is flat.      Palpations: Abdomen is soft.      Tenderness: There is generalized abdominal tenderness.   Musculoskeletal:      Right lower leg: No edema.      Left lower leg: No edema.   Skin:     General: Skin is warm and dry.   Neurological:      General: No focal deficit present.      Mental Status: He is alert and oriented to person, place, and time.   Psychiatric:         Mood and Affect: Mood normal.         Behavior: Behavior normal.           DDX/DIAGNOSTIC RESULTS / EMERGENCY DEPARTMENT COURSE / MDM     Differential Diagnosis included but not limited: " Constipation     Diagnoses Considered but Do Not Suspect:  infectious abdominal pain    Decision Rules/Scores utilized: N/A     Tests considered but not ordered and why:  N/A     MIPS: N/A     Code Status Discussion:  Not Discussed    Additional Patient Education Provided: None     Medical Decision Making    Medical Decision Making  Patient presenting with concerns for constipation, recently started taking opioid therapy PT for back pain.  Do feel it is more secondary to opioid-induced constipation.  Patient denies any fevers or chills.  Plan to educate patient on digital disimpaction, will give enema.  Patient perform self digital disimpaction, received enema, had large bowel movement.  Patient states that he had resolution of abdominal pain, feels much better at this time.  Do feel patient stable for discharge without any laboratory workup.  Patient's vital stable.  Patient agreeable to plan discharged home in stable condition.  Will provide MiraLAX for continued treatment over the next couple days.  Patient educated on fiber diet.    Problems Addressed:  Constipation, unspecified constipation type: acute illness or injury    Amount and/or Complexity of Data Reviewed  External Data Reviewed: notes.    Risk  OTC drugs.        See ED COURSE for additional MDM statements    EKG  None Performed     All EKG's are interpreted by the Emergency Department Physician who either signs or Co-signs this chart in the absence of a cardiologist.    Additional Scores                   EMERGENCY DEPARTMENT COURSE:         PROCEDURES:  None Performed   Procedures    DATA FOR LAB AND RADIOLOGY TESTS ORDERED BELOW ARE REVIEWED BY THE ED CLINICIAN:    RADIOLOGY: All x-rays, CT, MRI, and formal ultrasound images (except ED bedside ultrasound) are read by the radiologist, see reports below, unless otherwise noted in MDM or here.  Reports below are reviewed by myself.  No orders to display       LABS: Lab orders shown below, the results  "are reviewed by myself, and all abnormals are listed below.  Labs Reviewed - No data to display    Vitals Reviewed:    Vitals:    03/22/25 0551   BP: 155/93   BP Location: Left arm   Patient Position: Sitting   Pulse: 92   Resp: 18   Temp: 98.3 °F (36.8 °C)   TempSrc: Oral   SpO2: 99%   Weight: 84.8 kg (187 lb)   Height: 177.8 cm (70\")       MEDICATIONS GIVEN TO PATIENT THIS ENCOUNTER:  Medications   polyethylene glycol (MIRALAX) packet 17 g (17 g Oral Given 3/22/25 0659)       CONSULTS:  None    CRITICAL CARE:  There was significant risk of life threatening deterioration of patient's condition requiring my direct management. Critical care time 0 minutes, excluding any documented procedures.    FINAL IMPRESSION      1. Constipation, unspecified constipation type          DISPOSITION / PLAN     ED Disposition       ED Disposition   Discharge    Condition   Stable    Comment   --               PATIENT REFERRED TO:  [facility] [address]  [phone]  Schedule an appointment as soon as possible for a visit   Call to schedule primary care appointment in the next couple days.      DISCHARGE MEDICATIONS:     Medication List        START taking these medications      polyethylene glycol 17 g packet  Commonly known as: MIRALAX  Take 17 g by mouth 2 (Two) Times a Day As Needed (constipation) for up to 5 days.            CONTINUE taking these medications      gabapentin 100 MG capsule  Commonly known as: NEURONTIN  Take 1 capsule by mouth 3 (Three) Times a Day for 30 days.     metFORMIN 500 MG tablet  Commonly known as: GLUCOPHAGE  Take 2 tablets by mouth 2 (Two) Times a Day With Meals.     methylPREDNISolone 4 MG dose pack  Commonly known as: MEDROL  Take as directed on package instructions.     oxyCODONE 5 MG immediate release tablet  Commonly known as: Roxicodone  Take 1 tablet by mouth Every 4 (Four) Hours As Needed for Moderate Pain.               Where to Get Your Medications    "     These medications were sent to [facility]/pharmacy #[id number] - [place], [place] - [address] [facility] - [phone]  - [phone]   [address] [address]      Phone: [phone]   polyethylene glycol 17 g packet         Electronically signed by Carson Wilson DO, 03/22/25, 6:04 AM EDT.    Emergency Medicine Physician  [facility]  (Please note that portions of thisnote were completed with a voice recognition program.  Efforts were made to edit the dictations but occasionally words are mis-transcribed.)       Carson Wilson DO  03/22/25 1261

## 2025-03-22 NOTE — DISCHARGE INSTRUCTIONS
If you notice any concerning symptoms, please return to the ER immediately. These can include but are not limited to: worsening of you condition, fevers, chills, shortness of breath, vomiting, weakness of the extremities, changes in your mental status, numbness, pale extremities, or chest pain.     Take medications as prescribed, your pharmacist may have additional recommendations concerning these medications.    For pain use ibuprofen (Motrin) or acetaminophen (Tylenol), unless prescribed medications that also contain these medications.  You can take over the counter acetaminophen or ibuprofen, please follow the directions as dosages differ. Do not take ibuprofen if you have a history of peptic ulcers, kidney disease, bariatric surgery, or are currently pregnant.  Do not take Tylenol if you have a history of liver disease or alcohol use disorder.        THANK YOU!!! From [facility] Emergency Department    On behalf of the Emergency Department staff at [facility], I would like to thank you for giving us the opportunity to address your health care needs and concerns. We hope that during your visit, our service was delivered in a professional and caring manner. Please keep [facility] in mind as we walk with you down the path to your own personal wellness. Please expect follow-up phone calls concerning additional care and questions about your experience.      You have received additional information specific to your diagnosis in these discharge instructions, please read these fully.  Anytime you have been seen in the emergency department we recommend close follow up with your primary care provider or specialist, please follow these directions as indicated.

## 2025-04-10 PROBLEM — I73.9 PVD (PERIPHERAL VASCULAR DISEASE): Status: ACTIVE | Noted: 2025-04-10

## 2025-05-22 NOTE — PROGRESS NOTES
"     [facility] Cardiothoracic Surgery New Patient Office Note     Date of Encounter: 2025     Name: Gagan Oconnell  : 1970     Referred By: Carson Burkett *  PCP: Provider, No Known    Chief Complaint:    Chief Complaint   Patient presents with    Consult     NP per Dr. Carson Burkett for PVD-complains of right lower extremity numbness       Subjective      History of Present Illness:    Gagan Oconnell is a 55 y.o. male new patient referred to Dr. Villarreal per PCP, Carson Burkett MD for PVD.  PMH: Type 2 diabetes (hemoglobin A1c 10.6%), hypertension, PVD. Patient was evaluated in [facility] ER 3/2/25 for RLE pain.  CTA imaging was completed during that visit and he has been referred to Vascular Surgery for follow up.  Patient states RLE pain has been ongoing at least 6 months and describes it as \"tightness\" right calf.  He also reports feeling right hip ROM is limited causing him not to be able to flex right hip fully.  No LE wounds/ulcerations.  No hx injury.  He is a non-smoker.      Review of Systems:  Review of Systems   Constitutional: Negative for chills, decreased appetite, diaphoresis, fever, malaise/fatigue, night sweats and weight gain.   HENT:  Negative for hoarse voice.    Eyes:  Negative for blurred vision, double vision and visual disturbance.   Cardiovascular:  Positive for claudication (right leg) and leg swelling (right leg feels tight in calf). Negative for chest pain, dyspnea on exertion, irregular heartbeat, near-syncope, orthopnea, palpitations, paroxysmal nocturnal dyspnea and syncope.   Respiratory:  Negative for cough, hemoptysis, shortness of breath, sputum production and wheezing.    Hematologic/Lymphatic: Negative for adenopathy and bleeding problem. Bruises/bleeds easily.   Skin:  Negative for color change, nail changes, poor wound healing and rash.   Musculoskeletal:  Negative for back pain, falls and muscle cramps. "   Gastrointestinal:  Negative for abdominal pain, dysphagia and heartburn.   Genitourinary:  Negative for flank pain.   Neurological:  Positive for numbness (right leg). Negative for brief paralysis, disturbances in coordination, dizziness, focal weakness, headaches, light-headedness, loss of balance, paresthesias, sensory change, vertigo and weakness.   Psychiatric/Behavioral:  Negative for depression and suicidal ideas.    Allergic/Immunologic: Negative for persistent infections.       I have reviewed the following portions of the patient's history: problem list, current medications, allergies, past surgical history, past medical history, past social history, past family history, and ROS and confirm it's accurate.    Allergies:  No Known Allergies    Medications:      Current Outpatient Medications:     aspirin 81 MG EC tablet, Take 1 tablet by mouth Daily., Disp: 90 tablet, Rfl: 0    gabapentin (NEURONTIN) 100 MG capsule, Take 1 capsule by mouth 3 (Three) Times a Day for 30 days., Disp: 90 capsule, Rfl: 0    metFORMIN (GLUCOPHAGE) 500 MG tablet, Take 2 tablets by mouth 2 (Two) Times a Day With Meals. (Patient not taking: Reported on 5/27/2025), Disp: 360 tablet, Rfl: 1    methylPREDNISolone (MEDROL) 4 MG dose pack, Take as directed on package instructions. (Patient not taking: Reported on 5/27/2025), Disp: 21 tablet, Rfl: 0    oxyCODONE (Roxicodone) 5 MG immediate release tablet, Take 1 tablet by mouth Every 4 (Four) Hours As Needed for Moderate Pain. (Patient not taking: Reported on 5/27/2025), Disp: 12 tablet, Rfl: 0    History:   Past Medical History:   Diagnosis Date    Bell's palsy     Diabetes mellitus     Kidney stones     Peripheral vascular disease        History reviewed. No pertinent surgical history.    Social History     Socioeconomic History    Marital status:     Number of children: 1   Tobacco Use    Smoking status: Never     Passive exposure: Never    Smokeless tobacco: Never   Vaping Use  "   Vaping status: Never Used   Substance and Sexual Activity    Alcohol use: No    Drug use: No    Sexual activity: Defer        Family History   Problem Relation Age of Onset    Diabetes Father     No Known Problems Son        Objective   Physical Exam:  Vitals:    05/27/25 0820 05/27/25 0821   BP: 138/100 132/80   BP Location: Left arm Right arm   Patient Position: Sitting Sitting   Pulse: 75    Temp: 98.1 °F (36.7 °C)    SpO2: 98%    Weight: 83 kg (183 lb)    Height: 177.8 cm (70\")  Comment: patient reported       Body mass index is 26.26 kg/m².    Physical Exam  Vitals reviewed.   Constitutional:       General: He is not in acute distress.     Appearance: He is not toxic-appearing.   HENT:      Head: Normocephalic and atraumatic.   Eyes:      General: Lids are normal.      Conjunctiva/sclera: Conjunctivae normal.      Pupils: Pupils are equal, round, and reactive to light.   Cardiovascular:      Rate and Rhythm: Normal rate and regular rhythm.      Pulses:           Carotid pulses are 2+ on the right side and 2+ on the left side.       Radial pulses are 2+ on the right side and 2+ on the left side.        Popliteal pulses are 2+ on the right side and 2+ on the left side.        Dorsalis pedis pulses are 1+ on the right side and 1+ on the left side.        Posterior tibial pulses are detected w/ Doppler on the right side and detected w/ Doppler on the left side.      Heart sounds: S1 normal and S2 normal. No murmur heard.  Pulmonary:      Effort: Pulmonary effort is normal. No respiratory distress.      Breath sounds: Normal breath sounds.   Musculoskeletal:         General: Normal range of motion.      Cervical back: Normal range of motion and neck supple.      Right lower leg: No edema.      Left lower leg: No edema.   Feet:      Right foot:      Skin integrity: Skin integrity normal.      Toenail Condition: Right toenails are normal.      Left foot:      Skin integrity: Skin integrity normal.      Comments: " Subungual hematoma left great toe  Skin:     General: Skin is warm and dry.      Capillary Refill: Capillary refill takes less than 2 seconds.   Neurological:      General: No focal deficit present.      Mental Status: He is alert and oriented to person, place, and time.      GCS: GCS eye subscore is 4. GCS verbal subscore is 5. GCS motor subscore is 6.   Psychiatric:         Attention and Perception: Attention normal.         Mood and Affect: Mood normal.         Speech: Speech normal.         Behavior: Behavior is cooperative.         Cognition and Memory: Cognition normal.         Imaging/Labs:  CT ANGIO ABDOMINAL AORTA BILAT ILIOFEM RUNOFF: 3/2/2025  (Personally reviewed and agree w/ Radiologist assessment)  HISTORY: Peripheral vascular disease , severe pain, sensation changes   COMPARISON: CT abdomen and pelvis 06/07/2017   FINDINGS:   CT ANGIOGRAM ABDOMEN AND PELVIS: No aortic aneurysm or dissection identified. The SMA is patent. There are single nonstenotic bilateral renal arteries. Noted is separate origins of the right gastric and splenic arteries which appear normal.   CTA RIGHT LOWER EXTREMITY: Right common, internal and external iliac arteries appear normal as does the right common femoral artery. Right profunda is patent. Mild calcified plaque identified throughout the right superficial femoral artery with mild disease in the right popliteal artery. There is diffuse calcified plaque in the right posterior tibial artery with moderate stenoses identified in the distal right lower leg. This is patent into the distal foot. There is mild calcified plaque throughout the right anterior tibial artery more prominent in the distal right lower leg. Right anterior tibial artery is patent into the right distal foot. Right peroneal artery demonstrates diffuse mild calcific plaque and is patent into the hindfoot. Distal right great toe not included on this study.   CTA LEFT LOWER EXTREMITY: Minimal calcified plaque  identified in the left common iliac artery with no significant stenosis. The left internal and external iliac arteries and left common femoral artery as well as the profunda appear normal.  There is minimal calcified disease in the left SFA without significant stenosis. Mild, diffuse disease noted in the left popliteal artery as well. There is a moderate stenosis at the origin of the right posterior tibial artery with mild disease throughout more prominent in the distal left lower leg. Left posterior tibial artery is patent into the distal foot. Peroneal artery also demonstrates diffuse, mild disease and is  patent into the posterior foot. Left anterior tibial artery demonstrates diffuse calcified plaque and is patent into the distal left foot.  Abdomen: Lung bases are clear and heart size is normal. Calcified granulomata identified. Again noted is fatty infiltration of the liver and enlargement similar to the prior exam. Gallbladder is present and appears mildly distended, similar to prior. Spleen is normal in size. Normal appearance of the adrenals as well as the kidneys. Pancreas appears normal with no dilated duct or mass identified.   Pelvis: Appendix identified and appears normal. Bladder is moderately filled and appears normal. Prostate is normal in size and no free fluid is seen. No evidence of bowel obstruction identified. There is moderate stool burden suggesting constipation.   IMPRESSION:  No significant vascular disease in the abdomen or pelvis.   Diffuse vascular disease in the lower extremities bilaterally in a  fairly symmetric fashion, most prominent in the distal lower legs.  Three-vessel patency identified into the feet bilaterally.   Constipation.   This report was signed 3/2/2025 9:53 AM by Meggan Vásquez MD.    Assessment / Plan      Assessment / Plan:  1. PVD (peripheral vascular disease)  - 55 y.o. male new patient referred to Dr. Villarreal per PCP, Carson Burkett MD for PVD.    - PMH: Type  2 diabetes (hemoglobin A1c 10.6%), hypertension, PVD.   - Evaluated @ [facility] 3/2/25 for RLE pain.  CTA imaging: mild right popliteal calcific plaque extending into right posterior tibial artery without critical stenosis.  Otherwise, mild calcific plaque without critical stenosis throughout LLE system.    - Palpable popliteal and DP pulses with biphasic bilateral PT pulses per doppler  - Normal BLE skin integrity  - RLE symptoms not consistent w/ true claudication  - Personally reviewed and discussed imaging and exam w/ patient   - Recommend ASA 81 mg daily, establish w/ PCP, continue to follow with Endocrinology in order to bring HgA1C under control, consider alternate sources for RLE pain (perhaps neurogenic claudication)  - Explained although PVD is mild @ present, this can worsen significantly in the setting of uncontrolled diabetes.    - Continue to avoid tobacco use  - Recommend structured walking regimen       Patient Education:  A structured walking regimen is used to improve the circulation or blood flow in your legs. Recognize that walking may hurt at first - and that is good. In fact, the goal is to walk at a pace that causes mild or moderate pain or tightness in your legs. Pace yourself, stop to rest for a few minutes, but then resume walking. This discomfort triggers your body to improve your circulation. Repeat several times: Walk at a pace that causes mild or moderate leg pain, then rest, and keep going. Over time, you may find you are able to walk longer with less pain. That is a sign that your blood vessels are recovering. It may take months, so be patient and persistent.        Follow Up:   Return PRN per PCP request.   Or sooner for any further concerns or worsening sign and symptoms. If unable to reach us in the office please dial 911 or go to the nearest emergency department.      JOSE ARMANDO Morris  [facility] Cardiothoracic Surgery    Time Spent: I spent 45 minutes caring for  Gagan on this date of service. This time includes time spent by me in the following activities: preparing for the visit, reviewing tests, obtaining and/or reviewing a separately obtained history, performing a medically appropriate examination and/or evaluation, counseling and educating the patient/family/caregiver, documenting information in the medical record, independently interpreting results and communicating that information with the patient/family/caregiver, and care coordination.

## 2025-05-27 ENCOUNTER — OFFICE VISIT (OUTPATIENT)
Dept: CARDIAC SURGERY | Facility: CLINIC | Age: 55
End: 2025-05-27

## 2025-05-27 VITALS
TEMPERATURE: 98.1 F | DIASTOLIC BLOOD PRESSURE: 80 MMHG | HEIGHT: 70 IN | HEART RATE: 75 BPM | SYSTOLIC BLOOD PRESSURE: 132 MMHG | WEIGHT: 183 LBS | OXYGEN SATURATION: 98 % | BODY MASS INDEX: 26.2 KG/M2

## 2025-05-27 DIAGNOSIS — I73.9 PVD (PERIPHERAL VASCULAR DISEASE): Primary | ICD-10-CM

## 2025-05-27 PROCEDURE — 99204 OFFICE O/P NEW MOD 45 MIN: CPT | Performed by: NURSE PRACTITIONER

## 2025-05-27 RX ORDER — ASPIRIN 81 MG/1
81 TABLET ORAL DAILY
Qty: 90 TABLET | Refills: 0 | Status: SHIPPED | OUTPATIENT
Start: 2025-05-27 | End: 2026-05-27

## 2025-05-30 ENCOUNTER — PATIENT ROUNDING (BHMG ONLY) (OUTPATIENT)
Dept: CARDIAC SURGERY | Facility: CLINIC | Age: 55
End: 2025-05-30
Payer: MEDICARE

## 2025-05-30 NOTE — PROGRESS NOTES
May 30, 2025    Hello, may I speak with Gagan Oconnell?    My name is Tish      I am  with [facility] CARDIOTHORACIC SURGERY  [address]  [facility] [address] [phone].    Before we get started may I verify your date of birth? 1970    I am calling to officially welcome you to our practice and ask about your recent visit. Is this a good time to talk? YES    Tell me about your visit with us. What things went well?  WENT GOOD        We're always looking for ways to make our patients' experiences even better. Do you have recommendations on ways we may improve?  NO    Overall were you satisfied with your first visit to our practice? YES       I appreciate you taking the time to speak with me today. Is there anything else I can do for you? NO      Thank you, and have a great day.       DISPLAY PLAN FREE TEXT DISPLAY PLAN FREE TEXT